# Patient Record
Sex: FEMALE | Race: WHITE | NOT HISPANIC OR LATINO | Employment: UNEMPLOYED | URBAN - METROPOLITAN AREA
[De-identification: names, ages, dates, MRNs, and addresses within clinical notes are randomized per-mention and may not be internally consistent; named-entity substitution may affect disease eponyms.]

---

## 2023-03-23 PROBLEM — M54.16 RIGHT LUMBAR RADICULOPATHY: Status: ACTIVE | Noted: 2023-03-23

## 2023-03-23 PROBLEM — R20.2 NUMBNESS AND TINGLING: Status: ACTIVE | Noted: 2023-03-23

## 2023-03-23 PROBLEM — K21.9 CHRONIC GERD: Status: ACTIVE | Noted: 2023-03-23

## 2023-03-23 PROBLEM — E04.1 THYROID NODULE: Status: ACTIVE | Noted: 2023-03-23

## 2023-03-23 PROBLEM — R29.898 TRANSIENT RIGHT LEG WEAKNESS: Status: ACTIVE | Noted: 2023-03-23

## 2023-03-23 PROBLEM — M70.61 TROCHANTERIC BURSITIS OF RIGHT HIP: Status: ACTIVE | Noted: 2023-03-23

## 2023-03-23 PROBLEM — E78.5 HYPERLIPIDEMIA: Status: ACTIVE | Noted: 2023-03-23

## 2023-03-23 PROBLEM — M53.87 SCIATICA OF RIGHT SIDE ASSOCIATED WITH DISORDER OF LUMBOSACRAL SPINE: Status: ACTIVE | Noted: 2023-03-23

## 2023-03-23 PROBLEM — G43.009 MIGRAINE WITHOUT AURA AND WITHOUT STATUS MIGRAINOSUS, NOT INTRACTABLE: Status: ACTIVE | Noted: 2023-03-23

## 2023-03-23 PROBLEM — E06.3 HASHIMOTO'S DISEASE: Status: ACTIVE | Noted: 2023-03-23

## 2023-03-23 PROBLEM — R20.0 NUMBNESS AND TINGLING: Status: ACTIVE | Noted: 2023-03-23

## 2023-03-23 PROBLEM — E06.3 HYPOTHYROIDISM DUE TO HASHIMOTO'S THYROIDITIS: Status: ACTIVE | Noted: 2023-03-23

## 2023-03-23 PROBLEM — E55.9 VITAMIN D DEFICIENCY DISEASE: Status: ACTIVE | Noted: 2023-03-23

## 2023-03-23 PROBLEM — E03.8 HYPOTHYROIDISM DUE TO HASHIMOTO'S THYROIDITIS: Status: ACTIVE | Noted: 2023-03-23

## 2023-03-23 RX ORDER — ZINC GLUCONATE 100 MG
TABLET ORAL
COMMUNITY
Start: 2022-05-24

## 2023-03-23 RX ORDER — CHOLECALCIFEROL (VITAMIN D3) 125 MCG
CAPSULE ORAL
COMMUNITY
Start: 2022-05-24

## 2023-03-23 RX ORDER — LEVOTHYROXINE SODIUM 125 UG/1
1 TABLET ORAL DAILY
COMMUNITY
Start: 2022-05-24 | End: 2023-04-12 | Stop reason: SDUPTHER

## 2023-03-23 RX ORDER — MINERAL OIL
1 ENEMA (ML) RECTAL DAILY PRN
COMMUNITY
Start: 2022-05-24

## 2023-03-23 RX ORDER — BACLOFEN 10 MG/1
1 TABLET ORAL 3 TIMES DAILY PRN
COMMUNITY
Start: 2022-05-24 | End: 2023-03-28 | Stop reason: ALTCHOICE

## 2023-03-23 RX ORDER — IBUPROFEN 100 MG/5ML
SUSPENSION, ORAL (FINAL DOSE FORM) ORAL
COMMUNITY
Start: 2022-05-24

## 2023-03-23 RX ORDER — SUMATRIPTAN SUCCINATE 100 MG/1
TABLET ORAL
COMMUNITY
Start: 2022-05-24 | End: 2023-09-21 | Stop reason: SDUPTHER

## 2023-03-23 RX ORDER — OMEPRAZOLE 40 MG/1
1 CAPSULE, DELAYED RELEASE ORAL DAILY
COMMUNITY
Start: 2022-05-24

## 2023-03-23 RX ORDER — SIMVASTATIN 20 MG/1
1 TABLET, FILM COATED ORAL NIGHTLY
COMMUNITY
Start: 2022-05-24 | End: 2023-04-12 | Stop reason: SDUPTHER

## 2023-03-28 ENCOUNTER — LAB (OUTPATIENT)
Dept: LAB | Facility: LAB | Age: 63
End: 2023-03-28
Payer: COMMERCIAL

## 2023-03-28 ENCOUNTER — OFFICE VISIT (OUTPATIENT)
Dept: PRIMARY CARE | Facility: CLINIC | Age: 63
End: 2023-03-28
Payer: COMMERCIAL

## 2023-03-28 VITALS
WEIGHT: 162.2 LBS | HEART RATE: 80 BPM | DIASTOLIC BLOOD PRESSURE: 80 MMHG | OXYGEN SATURATION: 97 % | HEIGHT: 65 IN | BODY MASS INDEX: 27.02 KG/M2 | SYSTOLIC BLOOD PRESSURE: 120 MMHG

## 2023-03-28 DIAGNOSIS — E03.8 HYPOTHYROIDISM DUE TO HASHIMOTO'S THYROIDITIS: ICD-10-CM

## 2023-03-28 DIAGNOSIS — G43.009 MIGRAINE WITHOUT AURA AND WITHOUT STATUS MIGRAINOSUS, NOT INTRACTABLE: ICD-10-CM

## 2023-03-28 DIAGNOSIS — E06.3 HYPOTHYROIDISM DUE TO HASHIMOTO'S THYROIDITIS: ICD-10-CM

## 2023-03-28 DIAGNOSIS — K21.9 CHRONIC GERD: ICD-10-CM

## 2023-03-28 DIAGNOSIS — R06.02 SHORTNESS OF BREATH: ICD-10-CM

## 2023-03-28 DIAGNOSIS — R07.89 ATYPICAL CHEST PAIN: Primary | ICD-10-CM

## 2023-03-28 PROBLEM — R20.2 NUMBNESS AND TINGLING: Status: RESOLVED | Noted: 2023-03-23 | Resolved: 2023-03-28

## 2023-03-28 PROBLEM — G47.09 OTHER INSOMNIA: Status: ACTIVE | Noted: 2023-03-28

## 2023-03-28 PROBLEM — M70.61 TROCHANTERIC BURSITIS OF RIGHT HIP: Status: RESOLVED | Noted: 2023-03-23 | Resolved: 2023-03-28

## 2023-03-28 PROBLEM — R29.898 TRANSIENT RIGHT LEG WEAKNESS: Status: RESOLVED | Noted: 2023-03-23 | Resolved: 2023-03-28

## 2023-03-28 PROBLEM — M53.87 SCIATICA OF RIGHT SIDE ASSOCIATED WITH DISORDER OF LUMBOSACRAL SPINE: Status: RESOLVED | Noted: 2023-03-23 | Resolved: 2023-03-28

## 2023-03-28 PROBLEM — R53.83 OTHER FATIGUE: Status: ACTIVE | Noted: 2023-03-28

## 2023-03-28 PROBLEM — R20.0 NUMBNESS AND TINGLING: Status: RESOLVED | Noted: 2023-03-23 | Resolved: 2023-03-28

## 2023-03-28 LAB
ALANINE AMINOTRANSFERASE (SGPT) (U/L) IN SER/PLAS: 20 U/L (ref 7–45)
ALBUMIN (G/DL) IN SER/PLAS: 4.6 G/DL (ref 3.4–5)
ALKALINE PHOSPHATASE (U/L) IN SER/PLAS: 69 U/L (ref 33–136)
ANION GAP IN SER/PLAS: 13 MMOL/L (ref 10–20)
ASPARTATE AMINOTRANSFERASE (SGOT) (U/L) IN SER/PLAS: 22 U/L (ref 9–39)
BASOPHILS (10*3/UL) IN BLOOD BY AUTOMATED COUNT: 0.04 X10E9/L (ref 0–0.1)
BASOPHILS/100 LEUKOCYTES IN BLOOD BY AUTOMATED COUNT: 0.7 % (ref 0–2)
BILIRUBIN TOTAL (MG/DL) IN SER/PLAS: 0.4 MG/DL (ref 0–1.2)
CALCIUM (MG/DL) IN SER/PLAS: 10.2 MG/DL (ref 8.6–10.3)
CARBON DIOXIDE, TOTAL (MMOL/L) IN SER/PLAS: 28 MMOL/L (ref 21–32)
CHLORIDE (MMOL/L) IN SER/PLAS: 103 MMOL/L (ref 98–107)
CREATININE (MG/DL) IN SER/PLAS: 0.7 MG/DL (ref 0.5–1.05)
EOSINOPHILS (10*3/UL) IN BLOOD BY AUTOMATED COUNT: 0.13 X10E9/L (ref 0–0.7)
EOSINOPHILS/100 LEUKOCYTES IN BLOOD BY AUTOMATED COUNT: 2.1 % (ref 0–6)
ERYTHROCYTE DISTRIBUTION WIDTH (RATIO) BY AUTOMATED COUNT: 13.8 % (ref 11.5–14.5)
ERYTHROCYTE MEAN CORPUSCULAR HEMOGLOBIN CONCENTRATION (G/DL) BY AUTOMATED: 32.6 G/DL (ref 32–36)
ERYTHROCYTE MEAN CORPUSCULAR VOLUME (FL) BY AUTOMATED COUNT: 94 FL (ref 80–100)
ERYTHROCYTES (10*6/UL) IN BLOOD BY AUTOMATED COUNT: 4.28 X10E12/L (ref 4–5.2)
GFR FEMALE: >90 ML/MIN/1.73M2
GLUCOSE (MG/DL) IN SER/PLAS: 96 MG/DL (ref 74–99)
HEMATOCRIT (%) IN BLOOD BY AUTOMATED COUNT: 40.2 % (ref 36–46)
HEMOGLOBIN (G/DL) IN BLOOD: 13.1 G/DL (ref 12–16)
IMMATURE GRANULOCYTES/100 LEUKOCYTES IN BLOOD BY AUTOMATED COUNT: 0.2 % (ref 0–0.9)
LEUKOCYTES (10*3/UL) IN BLOOD BY AUTOMATED COUNT: 6.1 X10E9/L (ref 4.4–11.3)
LYMPHOCYTES (10*3/UL) IN BLOOD BY AUTOMATED COUNT: 2.13 X10E9/L (ref 1.2–4.8)
LYMPHOCYTES/100 LEUKOCYTES IN BLOOD BY AUTOMATED COUNT: 35 % (ref 13–44)
MONOCYTES (10*3/UL) IN BLOOD BY AUTOMATED COUNT: 0.43 X10E9/L (ref 0.1–1)
MONOCYTES/100 LEUKOCYTES IN BLOOD BY AUTOMATED COUNT: 7.1 % (ref 2–10)
NEUTROPHILS (10*3/UL) IN BLOOD BY AUTOMATED COUNT: 3.35 X10E9/L (ref 1.2–7.7)
NEUTROPHILS/100 LEUKOCYTES IN BLOOD BY AUTOMATED COUNT: 54.9 % (ref 40–80)
PLATELETS (10*3/UL) IN BLOOD AUTOMATED COUNT: 241 X10E9/L (ref 150–450)
POTASSIUM (MMOL/L) IN SER/PLAS: 4.3 MMOL/L (ref 3.5–5.3)
PROTEIN TOTAL: 7.1 G/DL (ref 6.4–8.2)
SODIUM (MMOL/L) IN SER/PLAS: 140 MMOL/L (ref 136–145)
THYROTROPIN (MIU/L) IN SER/PLAS BY DETECTION LIMIT <= 0.05 MIU/L: 0.55 MIU/L (ref 0.44–3.98)
UREA NITROGEN (MG/DL) IN SER/PLAS: 13 MG/DL (ref 6–23)

## 2023-03-28 PROCEDURE — 99214 OFFICE O/P EST MOD 30 MIN: CPT | Performed by: NURSE PRACTITIONER

## 2023-03-28 PROCEDURE — 84443 ASSAY THYROID STIM HORMONE: CPT

## 2023-03-28 PROCEDURE — 80053 COMPREHEN METABOLIC PANEL: CPT

## 2023-03-28 PROCEDURE — 1036F TOBACCO NON-USER: CPT | Performed by: NURSE PRACTITIONER

## 2023-03-28 PROCEDURE — 36415 COLL VENOUS BLD VENIPUNCTURE: CPT

## 2023-03-28 PROCEDURE — 85025 COMPLETE CBC W/AUTO DIFF WBC: CPT

## 2023-03-28 RX ORDER — AZELASTINE 1 MG/ML
1 SPRAY, METERED NASAL 2 TIMES DAILY
COMMUNITY
End: 2023-09-21 | Stop reason: ALTCHOICE

## 2023-03-28 RX ORDER — MULTIVITAMIN
1 TABLET ORAL DAILY
COMMUNITY
Start: 2002-01-31

## 2023-03-28 RX ORDER — FLUTICASONE PROPIONATE 50 MCG
2 SPRAY, SUSPENSION (ML) NASAL DAILY
COMMUNITY
End: 2024-04-04 | Stop reason: ALTCHOICE

## 2023-03-28 ASSESSMENT — ENCOUNTER SYMPTOMS
SHORTNESS OF BREATH: 1
FATIGUE: 1
BACK PAIN: 1
HEADACHES: 1

## 2023-03-28 NOTE — ASSESSMENT & PLAN NOTE
Pt has reports she has not been taking her omeprazole. She is having stabbing pains in her abdomen

## 2023-03-28 NOTE — PATIENT INSTRUCTIONS
As we discussed I ordered routine blood work for you please complete this today we will notify you of the results.  I am checking thyroid levels this could contribute to your tiredness fatigue as well as a CBC and a CMP.    Continue make sure you are remaining hydrated    Due to intermittent chest discomfort I have ordered a CT calcium scoring as well as a referral to cardiology    Please continue to increase your physical activity as tolerated.    Follow-up in 4 to 6 weeks

## 2023-03-28 NOTE — PROGRESS NOTES
Subjective   Patient ID: Eduarda Foss is a 63 y.o. female who presents for Fatigue (Pt also is having weakness in her legs), Shortness of Breath, Back Pain, Headache, and Memory Loss.    Fatigue  This is a chronic problem. The current episode started more than 1 year ago. The problem occurs constantly. The problem has been unchanged. Associated symptoms include chest pain, fatigue and headaches. The symptoms are aggravated by standing and walking. She has tried nothing for the symptoms. The treatment provided no relief.   Shortness of Breath  The current episode started more than 1 month ago. The problem occurs intermittently. The problem has been waxing and waning. Associated symptoms include chest pain and headaches. The treatment provided mild relief.   Back Pain  This is a chronic problem. The current episode started more than 1 month ago. The problem occurs intermittently. The problem is unchanged. The pain is present in the lumbar spine. Associated symptoms include chest pain and headaches.   Headache   This is a chronic problem. The current episode started more than 1 year ago. The problem has been gradually improving. Associated symptoms include back pain.   Memory Loss    Patient reports onset of memory loss was 1 to 6 months ago. Onset quality is gradual.     Symptoms associated with memory loss include changes in short-term memory, changes in long-term memory and difficulty recalling words. Symptoms do not include disorientation outside familiar environments.    Patient does not have the following behavorial problems associated with memory loss: paranoia, hallucinations, delusions or agitation.    The family and/or patient does not have the following concerns associated with memory loss: medication errors, wandering, driving, cooking or preparing meals.       Review of Systems   Constitutional:  Positive for fatigue.   Respiratory:  Positive for shortness of breath.    Cardiovascular:  Positive for chest  "pain.        Chest pain has been intermittent but only last for a few seconds.    Musculoskeletal:  Positive for back pain.   Neurological:  Positive for headaches.   All other systems reviewed and are negative.      Objective   /80   Pulse 80   Ht 1.651 m (5' 5\")   Wt 73.6 kg (162 lb 3.2 oz)   SpO2 97%   BMI 26.99 kg/m²     Physical Exam  Constitutional:       Appearance: Normal appearance.   Cardiovascular:      Rate and Rhythm: Normal rate.      Heart sounds: Normal heart sounds.   Pulmonary:      Effort: Pulmonary effort is normal.      Breath sounds: Normal breath sounds.   Abdominal:      General: Abdomen is flat. Bowel sounds are normal. There is no distension.      Palpations: Abdomen is soft. There is no mass.      Tenderness: There is no abdominal tenderness. There is no right CVA tenderness, left CVA tenderness, guarding or rebound.      Hernia: No hernia is present.   Musculoskeletal:         General: Normal range of motion.   Skin:     General: Skin is warm and dry.   Neurological:      General: No focal deficit present.      Mental Status: She is alert and oriented to person, place, and time.   Psychiatric:         Mood and Affect: Mood normal.         Assessment/Plan   Problem List Items Addressed This Visit          Respiratory    Shortness of breath    Relevant Orders    Referral to Cardiology    CT cardiac scoring wo IV contrast       Digestive    Chronic GERD     Pt has reports she has not been taking her omeprazole. She is having stabbing pains in her abdomen         Relevant Orders    Tsh With Reflex To Free T4 If Abnormal    CBC and Auto Differential    Comprehensive Metabolic Panel       Endocrine/Metabolic    Hypothyroidism due to Hashimoto's thyroiditis     She feels more tired then usual.  She feels it could be related to not sleeping well.          Relevant Orders    Tsh With Reflex To Free T4 If Abnormal    CBC and Auto Differential    Comprehensive Metabolic Panel       Other "    Migraine without aura and without status migrainosus, not intractable     Pt reports well controlled and states it has been maybe one in last 3 months           Relevant Orders    Tsh With Reflex To Free T4 If Abnormal    CBC and Auto Differential    Comprehensive Metabolic Panel    Atypical chest pain - Primary    Relevant Orders    Referral to Cardiology    CT cardiac scoring wo IV contrast

## 2023-03-28 NOTE — ASSESSMENT & PLAN NOTE
Pt reports back pain has improved. She continues to stretch. She states for the most part as long as she is moving she is better

## 2023-03-30 ENCOUNTER — TELEPHONE (OUTPATIENT)
Dept: PRIMARY CARE | Facility: CLINIC | Age: 63
End: 2023-03-30
Payer: COMMERCIAL

## 2023-04-03 ENCOUNTER — TELEPHONE (OUTPATIENT)
Dept: PRIMARY CARE | Facility: CLINIC | Age: 63
End: 2023-04-03
Payer: COMMERCIAL

## 2023-04-03 NOTE — TELEPHONE ENCOUNTER
Requesting a referral for a neurologist. Pt states she is still having migraines that effect her eyes, along with numbness in her fingers.

## 2023-04-04 DIAGNOSIS — G43.009 MIGRAINE WITHOUT AURA AND WITHOUT STATUS MIGRAINOSUS, NOT INTRACTABLE: ICD-10-CM

## 2023-04-12 DIAGNOSIS — E03.8 HYPOTHYROIDISM DUE TO HASHIMOTO'S THYROIDITIS: ICD-10-CM

## 2023-04-12 DIAGNOSIS — E06.3 HYPOTHYROIDISM DUE TO HASHIMOTO'S THYROIDITIS: ICD-10-CM

## 2023-04-12 DIAGNOSIS — E78.5 HYPERLIPIDEMIA, UNSPECIFIED HYPERLIPIDEMIA TYPE: ICD-10-CM

## 2023-04-12 RX ORDER — SIMVASTATIN 20 MG/1
20 TABLET, FILM COATED ORAL NIGHTLY
Qty: 90 TABLET | Refills: 1 | Status: SHIPPED | OUTPATIENT
Start: 2023-04-12 | End: 2023-10-26

## 2023-04-12 RX ORDER — LEVOTHYROXINE SODIUM 125 UG/1
125 TABLET ORAL DAILY
Qty: 90 TABLET | Refills: 1 | Status: SHIPPED | OUTPATIENT
Start: 2023-04-12 | End: 2023-10-26

## 2023-06-02 ENCOUNTER — TELEPHONE (OUTPATIENT)
Dept: PRIMARY CARE | Facility: CLINIC | Age: 63
End: 2023-06-02
Payer: COMMERCIAL

## 2023-06-02 NOTE — TELEPHONE ENCOUNTER
----- Message from TANMAY Winn sent at 6/2/2023  2:02 PM EDT -----  Lab results Mild risk for coronary artery disease.  Recommend we repeat CT calcium scoring in 5 years

## 2023-06-02 NOTE — TELEPHONE ENCOUNTER
Result Communication    Resulted Orders   CT cardiac scoring wo IV contrast    Narrative    Interpreted By:  MARTIN CLARKE MD  MRN: [id number]  Patient Name: KIARA BENEDICT     STUDY:  CT CARDIAC SCORING;  6/2/2023 7:22 am     INDICATION:  family history of cardiovascular disease, mild dialation of thoraic  aorta on previous CT cardiac scoring.     COMPARISON:  None.     ACCESSION NUMBER(S):  [id number]     ORDERING CLINICIAN:  HAMZAH KOCH     TECHNIQUE:  Using prospective ECG gating, CT scan of the coronary arteries was  performed without intravenous contrast. Coronary calcium scoring  was  performed according to the method of Agatston.     FINDINGS:  The score and distribution of calcium in the coronary arteries is as  follows:     LM 24.29,  LAD 53.29,  LCx 18.29,  RCA 0,     Total 95.87     The visualized ascending thoracic aorta measures 3.9 cm in diameter.  The heart is normal in size. No pericardial effusion is present.     No gross evidence of mediastinal or hilar lymphadenopathy or masses  is identified. The visualized segments of the lungs are normally  expanded.     The main pulmonary artery, right and left pulmonary artery are normal  in size.     The visualized subdiaphragmatic structures appear intact.       Impression    1. Coronary artery calcium score of 95.87.*.  2. Mild interval increase in atherosclerotic calcification of the  coronary arteries with slight interval increase in ascending aortic  caliber when compared to a study of 02/18. Correlate with a component  of systemic hypertension. Recommend a continued surveillance CT scan  in 5 years to monitor calcium score and aortic size.     *Coronary Artery  Agatston score     Score  risk  Very low 1-99  Mildly increased 100-299  Moderately increased >300  Moderate to severely increased >800     Gulshan et al. JCCT 2016 (http://dx.doi.org/10.1016/j.jcct.[email or web address])     RANDOLPH 10-Year CHD Risk with Coronary Artery Calcification can be  calcuate using  link below  [email or web address]  Dominik banks al. JACC 2015 (http://dx.doi.org/10.1016/j.j  acc.[email or web address])       4:38 PM      Results were successfully communicated with the patient and they acknowledged their understanding.

## 2023-09-20 PROBLEM — K21.9 GASTROESOPHAGEAL REFLUX DISEASE WITHOUT ESOPHAGITIS: Status: ACTIVE | Noted: 2021-05-04

## 2023-09-20 PROBLEM — T78.40XA ALLERGIC: Status: ACTIVE | Noted: 2020-06-20

## 2023-09-20 PROBLEM — R09.81 NASAL CONGESTION: Status: RESOLVED | Noted: 2021-04-06 | Resolved: 2023-09-20

## 2023-09-20 PROBLEM — J37.0 CHRONIC LARYNGITIS: Status: ACTIVE | Noted: 2021-05-04

## 2023-09-20 PROBLEM — J30.2 SEASONAL ALLERGIC RHINITIS: Status: ACTIVE | Noted: 2021-04-06

## 2023-09-20 RX ORDER — LEVOTHYROXINE SODIUM 100 UG/1
TABLET ORAL
COMMUNITY
End: 2024-04-04 | Stop reason: ALTCHOICE

## 2023-09-20 RX ORDER — MONTELUKAST SODIUM 10 MG/1
1 TABLET ORAL DAILY
COMMUNITY
End: 2024-04-04 | Stop reason: ALTCHOICE

## 2023-09-20 RX ORDER — PHENOL 1.4 %
AEROSOL, SPRAY (ML) MUCOUS MEMBRANE
COMMUNITY
Start: 2022-05-24 | End: 2024-04-04 | Stop reason: ALTCHOICE

## 2023-09-20 RX ORDER — LEVOCETIRIZINE DIHYDROCHLORIDE 5 MG/1
1 TABLET, FILM COATED ORAL DAILY
COMMUNITY
End: 2024-04-04 | Stop reason: ALTCHOICE

## 2023-09-20 ASSESSMENT — ENCOUNTER SYMPTOMS
HYPERTENSION: 1
BLURRED VISION: 0
SHORTNESS OF BREATH: 0
HEADACHES: 1
SWEATS: 0
PND: 0
ORTHOPNEA: 0
PALPITATIONS: 0
NECK PAIN: 1

## 2023-09-21 ENCOUNTER — OFFICE VISIT (OUTPATIENT)
Dept: PRIMARY CARE | Facility: CLINIC | Age: 63
End: 2023-09-21
Payer: COMMERCIAL

## 2023-09-21 ENCOUNTER — LAB (OUTPATIENT)
Dept: LAB | Facility: LAB | Age: 63
End: 2023-09-21
Payer: COMMERCIAL

## 2023-09-21 VITALS
BODY MASS INDEX: 26.16 KG/M2 | DIASTOLIC BLOOD PRESSURE: 98 MMHG | WEIGHT: 157 LBS | HEART RATE: 81 BPM | SYSTOLIC BLOOD PRESSURE: 134 MMHG | HEIGHT: 65 IN | OXYGEN SATURATION: 98 %

## 2023-09-21 DIAGNOSIS — G43.009 MIGRAINE WITHOUT AURA AND WITHOUT STATUS MIGRAINOSUS, NOT INTRACTABLE: ICD-10-CM

## 2023-09-21 DIAGNOSIS — I10 HTN (HYPERTENSION), BENIGN: ICD-10-CM

## 2023-09-21 DIAGNOSIS — E78.2 MIXED HYPERLIPIDEMIA: ICD-10-CM

## 2023-09-21 DIAGNOSIS — I10 HTN (HYPERTENSION), BENIGN: Primary | ICD-10-CM

## 2023-09-21 LAB
ALANINE AMINOTRANSFERASE (SGPT) (U/L) IN SER/PLAS: 18 U/L (ref 7–45)
ALBUMIN (G/DL) IN SER/PLAS: 4.7 G/DL (ref 3.4–5)
ALKALINE PHOSPHATASE (U/L) IN SER/PLAS: 61 U/L (ref 33–136)
ANION GAP IN SER/PLAS: 14 MMOL/L (ref 10–20)
ASPARTATE AMINOTRANSFERASE (SGOT) (U/L) IN SER/PLAS: 18 U/L (ref 9–39)
BILIRUBIN TOTAL (MG/DL) IN SER/PLAS: 0.4 MG/DL (ref 0–1.2)
CALCIUM (MG/DL) IN SER/PLAS: 9.8 MG/DL (ref 8.6–10.3)
CARBON DIOXIDE, TOTAL (MMOL/L) IN SER/PLAS: 28 MMOL/L (ref 21–32)
CHLORIDE (MMOL/L) IN SER/PLAS: 102 MMOL/L (ref 98–107)
CHOLESTEROL (MG/DL) IN SER/PLAS: 182 MG/DL (ref 0–199)
CHOLESTEROL IN HDL (MG/DL) IN SER/PLAS: 71.1 MG/DL
CHOLESTEROL/HDL RATIO: 2.6
CREATININE (MG/DL) IN SER/PLAS: 0.72 MG/DL (ref 0.5–1.05)
ERYTHROCYTE DISTRIBUTION WIDTH (RATIO) BY AUTOMATED COUNT: 13.3 % (ref 11.5–14.5)
ERYTHROCYTE MEAN CORPUSCULAR HEMOGLOBIN CONCENTRATION (G/DL) BY AUTOMATED: 32.5 G/DL (ref 32–36)
ERYTHROCYTE MEAN CORPUSCULAR VOLUME (FL) BY AUTOMATED COUNT: 95 FL (ref 80–100)
ERYTHROCYTES (10*6/UL) IN BLOOD BY AUTOMATED COUNT: 4.22 X10E12/L (ref 4–5.2)
GFR FEMALE: >90 ML/MIN/1.73M2
GLUCOSE (MG/DL) IN SER/PLAS: 83 MG/DL (ref 74–99)
HEMATOCRIT (%) IN BLOOD BY AUTOMATED COUNT: 40 % (ref 36–46)
HEMOGLOBIN (G/DL) IN BLOOD: 13 G/DL (ref 12–16)
LEUKOCYTES (10*3/UL) IN BLOOD BY AUTOMATED COUNT: 6.3 X10E9/L (ref 4.4–11.3)
NON-HDL CHOLESTEROL: 111 MG/DL
PLATELETS (10*3/UL) IN BLOOD AUTOMATED COUNT: 268 X10E9/L (ref 150–450)
POTASSIUM (MMOL/L) IN SER/PLAS: 4.8 MMOL/L (ref 3.5–5.3)
PROTEIN TOTAL: 7.5 G/DL (ref 6.4–8.2)
SODIUM (MMOL/L) IN SER/PLAS: 139 MMOL/L (ref 136–145)
UREA NITROGEN (MG/DL) IN SER/PLAS: 18 MG/DL (ref 6–23)

## 2023-09-21 PROCEDURE — 36415 COLL VENOUS BLD VENIPUNCTURE: CPT

## 2023-09-21 PROCEDURE — 85027 COMPLETE CBC AUTOMATED: CPT

## 2023-09-21 PROCEDURE — 3075F SYST BP GE 130 - 139MM HG: CPT | Performed by: NURSE PRACTITIONER

## 2023-09-21 PROCEDURE — 83718 ASSAY OF LIPOPROTEIN: CPT

## 2023-09-21 PROCEDURE — 99213 OFFICE O/P EST LOW 20 MIN: CPT | Performed by: NURSE PRACTITIONER

## 2023-09-21 PROCEDURE — 82465 ASSAY BLD/SERUM CHOLESTEROL: CPT

## 2023-09-21 PROCEDURE — 3080F DIAST BP >= 90 MM HG: CPT | Performed by: NURSE PRACTITIONER

## 2023-09-21 PROCEDURE — 80053 COMPREHEN METABOLIC PANEL: CPT

## 2023-09-21 PROCEDURE — 1036F TOBACCO NON-USER: CPT | Performed by: NURSE PRACTITIONER

## 2023-09-21 RX ORDER — SUMATRIPTAN SUCCINATE 100 MG/1
100 TABLET ORAL ONCE AS NEEDED
Qty: 9 TABLET | Refills: 2 | Status: SHIPPED | OUTPATIENT
Start: 2023-09-21

## 2023-09-21 RX ORDER — HYDROCHLOROTHIAZIDE 12.5 MG/1
12.5 TABLET ORAL DAILY
Qty: 30 TABLET | Refills: 1 | Status: SHIPPED | OUTPATIENT
Start: 2023-09-21 | End: 2023-11-20

## 2023-09-21 ASSESSMENT — ENCOUNTER SYMPTOMS
HYPERTENSION: 1
NECK PAIN: 1
PND: 0
ORTHOPNEA: 0
HEADACHES: 1
SHORTNESS OF BREATH: 0
PALPITATIONS: 0
BLURRED VISION: 0
SWEATS: 0

## 2023-09-21 ASSESSMENT — PATIENT HEALTH QUESTIONNAIRE - PHQ9
2. FEELING DOWN, DEPRESSED OR HOPELESS: NOT AT ALL
SUM OF ALL RESPONSES TO PHQ9 QUESTIONS 1 AND 2: 0
1. LITTLE INTEREST OR PLEASURE IN DOING THINGS: NOT AT ALL

## 2023-09-21 ASSESSMENT — COLUMBIA-SUICIDE SEVERITY RATING SCALE - C-SSRS
6. HAVE YOU EVER DONE ANYTHING, STARTED TO DO ANYTHING, OR PREPARED TO DO ANYTHING TO END YOUR LIFE?: NO
2. HAVE YOU ACTUALLY HAD ANY THOUGHTS OF KILLING YOURSELF?: NO
1. IN THE PAST MONTH, HAVE YOU WISHED YOU WERE DEAD OR WISHED YOU COULD GO TO SLEEP AND NOT WAKE UP?: NO

## 2023-09-21 NOTE — PATIENT INSTRUCTIONS
6 week blood pressure check - nurse visit    Starting on hydrochlorothiazide low dosage once daily    Here are some general tips to help manage and possibly lower high blood pressure:    Dietary Changes:    Reduce Sodium: Lower your sodium intake, which often means cutting back on processed foods and not adding extra salt to meals.  DASH Diet: Consider the Dietary Approaches to Stop Hypertension (DASH) diet which emphasizes fruits, vegetables, whole grains, and low-fat dairy products.  Limit Alcohol and Caffeine: Both can raise blood pressure, so consume them in moderation.  Maintain a Healthy Weight:    Even losing a small amount of weight can make a significant difference in lowering blood pressure.  Exercise Regularly:    Aim for at least 30 minutes of moderate aerobic activity most days of the week. Activities like brisk walking, cycling, or swimming can help.  Manage Stress:    Techniques such as deep breathing exercises, meditation, and yoga can be beneficial.  Limit Tobacco Use:    Avoid smoking and limit exposure to secondhand smoke. Nicotine can raise blood pressure and heart rate.  Limit Added Sugars and Saturated Fats:    These can contribute to weight gain and increased blood pressure.  Monitor Your Blood Pressure:    Regularly check your blood pressure at home using a home blood pressure monitor. This will help you keep track of any fluctuations and see the effects of lifestyle changes.  Medication:    If your doctor has prescribed medication for hypertension, be sure to take it as directed. Do not stop taking the medication without consulting your healthcare provider.  Limit Over-the-counter Medications:    Some over-the-counter medications, such as certain pain relievers, can raise blood pressure. Consult your doctor before taking any new medications.  Reduce Caffeine:    While the role of caffeine in blood pressure is still debated, it's a good idea for those with hypertension to consume it in  moderation until more is known.  Stay Hydrated:    Drink plenty of water throughout the day.  Limit Dietary Cholesterol:    Avoid foods high in cholesterol, which can contribute to plaque buildup in the arteries.

## 2023-09-21 NOTE — PROGRESS NOTES
"Subjective   Patient ID: Eduarda oFss is a 63 y.o. female who presents for Hypertension (Pt states her B/P has been running high the last week).    Has had elevated blood pressure recently . She has not felt well. She has had neck pain and headache not consistent intermittent . No vision changes     Hypertension  This is a new problem. The problem has been gradually worsening since onset. Associated symptoms include headaches and neck pain. Pertinent negatives include no anxiety, blurred vision, chest pain, orthopnea, palpitations, peripheral edema, PND, shortness of breath or sweats. Agents associated with hypertension include NSAIDs and thyroid hormones. Risk factors for coronary artery disease include dyslipidemia and family history. There are no compliance problems.         Review of Systems   Eyes:  Negative for blurred vision.   Respiratory:  Negative for shortness of breath.    Cardiovascular:  Negative for chest pain, palpitations, orthopnea and PND.   Musculoskeletal:  Positive for neck pain.   Neurological:  Positive for headaches.       Objective   BP (!) 134/98   Pulse 81   Ht 1.651 m (5' 5\")   Wt 71.2 kg (157 lb)   SpO2 98%   BMI 26.13 kg/m²     Physical Exam  Cardiovascular:      Rate and Rhythm: Normal rate.      Heart sounds: Normal heart sounds.   Pulmonary:      Effort: Pulmonary effort is normal.      Breath sounds: Normal breath sounds.   Skin:     General: Skin is warm.      Capillary Refill: Capillary refill takes less than 2 seconds.   Neurological:      General: No focal deficit present.      Mental Status: She is alert and oriented to person, place, and time.   Psychiatric:         Mood and Affect: Mood normal.         Behavior: Behavior normal.         Thought Content: Thought content normal.         Judgment: Judgment normal.         Assessment/Plan   Problem List Items Addressed This Visit       Hyperlipidemia    Relevant Orders    Comprehensive Metabolic Panel    Lipid Panel " Non-Fasting    Migraine without aura and without status migrainosus, not intractable    Relevant Medications    SUMAtriptan (Imitrex) 100 mg tablet    Other Relevant Orders    CBC     Other Visit Diagnoses       HTN (hypertension), benign    -  Primary    Relevant Medications    hydroCHLOROthiazide (HYDRODiuril) 12.5 mg tablet    Other Relevant Orders    Comprehensive Metabolic Panel    Lipid Panel Non-Fasting

## 2023-09-22 ENCOUNTER — TELEPHONE (OUTPATIENT)
Dept: PRIMARY CARE | Facility: CLINIC | Age: 63
End: 2023-09-22
Payer: COMMERCIAL

## 2023-10-26 DIAGNOSIS — E03.8 HYPOTHYROIDISM DUE TO HASHIMOTO'S THYROIDITIS: ICD-10-CM

## 2023-10-26 DIAGNOSIS — E06.3 HYPOTHYROIDISM DUE TO HASHIMOTO'S THYROIDITIS: ICD-10-CM

## 2023-10-26 DIAGNOSIS — E78.5 HYPERLIPIDEMIA, UNSPECIFIED HYPERLIPIDEMIA TYPE: ICD-10-CM

## 2023-10-26 RX ORDER — SIMVASTATIN 20 MG/1
20 TABLET, FILM COATED ORAL NIGHTLY
Qty: 90 TABLET | Refills: 1 | Status: SHIPPED | OUTPATIENT
Start: 2023-10-26

## 2023-10-26 RX ORDER — LEVOTHYROXINE SODIUM 125 UG/1
125 TABLET ORAL DAILY
Qty: 90 TABLET | Refills: 1 | Status: SHIPPED | OUTPATIENT
Start: 2023-10-26

## 2023-11-02 ENCOUNTER — CLINICAL SUPPORT (OUTPATIENT)
Dept: PRIMARY CARE | Facility: CLINIC | Age: 63
End: 2023-11-02
Payer: COMMERCIAL

## 2023-11-02 VITALS — SYSTOLIC BLOOD PRESSURE: 128 MMHG | DIASTOLIC BLOOD PRESSURE: 88 MMHG

## 2023-11-02 DIAGNOSIS — I10 HTN (HYPERTENSION), BENIGN: ICD-10-CM

## 2024-02-15 ENCOUNTER — OFFICE VISIT (OUTPATIENT)
Dept: OTOLARYNGOLOGY | Facility: CLINIC | Age: 64
End: 2024-02-15
Payer: COMMERCIAL

## 2024-02-15 VITALS — BODY MASS INDEX: 24.49 KG/M2 | WEIGHT: 147 LBS | HEIGHT: 65 IN

## 2024-02-15 DIAGNOSIS — J32.0 CHRONIC MAXILLARY SINUSITIS: ICD-10-CM

## 2024-02-15 DIAGNOSIS — J34.89 NASAL OBSTRUCTION: ICD-10-CM

## 2024-02-15 DIAGNOSIS — J30.89 NON-SEASONAL ALLERGIC RHINITIS DUE TO OTHER ALLERGIC TRIGGER: Primary | ICD-10-CM

## 2024-02-15 DIAGNOSIS — J32.2 CHRONIC ETHMOIDAL SINUSITIS: ICD-10-CM

## 2024-02-15 DIAGNOSIS — J32.1 CHRONIC FRONTAL SINUSITIS: ICD-10-CM

## 2024-02-15 PROCEDURE — 99214 OFFICE O/P EST MOD 30 MIN: CPT | Performed by: OTOLARYNGOLOGY

## 2024-02-15 PROCEDURE — 1036F TOBACCO NON-USER: CPT | Performed by: OTOLARYNGOLOGY

## 2024-02-15 RX ORDER — AZELASTINE 1 MG/ML
2 SPRAY, METERED NASAL 2 TIMES DAILY
Qty: 90 ML | Refills: 1 | Status: SHIPPED | OUTPATIENT
Start: 2024-02-15

## 2024-02-15 RX ORDER — CEFDINIR 300 MG/1
300 CAPSULE ORAL 2 TIMES DAILY
Qty: 28 CAPSULE | Refills: 0 | Status: SHIPPED | OUTPATIENT
Start: 2024-02-15 | End: 2024-02-29

## 2024-02-15 RX ORDER — FLUTICASONE PROPIONATE 50 MCG
2 SPRAY, SUSPENSION (ML) NASAL DAILY
Qty: 48 ML | Refills: 1 | Status: SHIPPED | OUTPATIENT
Start: 2024-02-15

## 2024-02-15 RX ORDER — METHYLPREDNISOLONE 4 MG/1
TABLET ORAL
Qty: 21 TABLET | Refills: 0 | Status: SHIPPED | OUTPATIENT
Start: 2024-02-15 | End: 2024-04-04 | Stop reason: ALTCHOICE

## 2024-02-15 ASSESSMENT — ENCOUNTER SYMPTOMS
HEADACHES: 1
EYE ITCHING: 1

## 2024-02-15 NOTE — PROGRESS NOTES
Subjective   Patient ID: Eduarda Foss is a 64 y.o. female  HPI  Patient is complaining of a chronic history of pressure in the maxillary and ethmoid and frontal areas bilaterally with nasal congestion.  The pressure seems to directly correlate with the change of the weather.  She complains of chronic rhinorrhea and postnasal drainage.  She has no fevers or chills or nausea or vomiting but she does complain of headaches.  She was recently treated with a 10-day course of antibiotics.  Review of Systems   HENT:  Positive for postnasal drip and sneezing.         Nasal pain, itchy nose, scratchy throat   Eyes:  Positive for itching.        Watery eyes   Neurological:  Positive for headaches.       Objective   Physical Exam  The following elements of a brief ear nose and throat exam were performed: External ear canals and tympanic membranes, external nose and nasal passages, oral cavity, palpation of the neck, percussion of the face, palpation of the thyroid.    There is nasal edema and erythema.  There is thick mucoid postnasal drainage noted.  There is mild tenderness with percussion of the maxillary and ethmoid and frontal areas bilaterally.  The remainder of her exam was within normal limits.  Assessment/Plan   Diagnoses and all orders for this visit:  Non-seasonal allergic rhinitis due to other allergic trigger (Primary)  -     fluticasone (Flonase) 50 mcg/actuation nasal spray; Administer 2 sprays into each nostril once daily. Shake gently. Before first use, prime pump. After use, clean tip and replace cap.  -     azelastine (Astelin) 137 mcg (0.1 %) nasal spray; Administer 2 sprays into each nostril 2 times a day.  Nasal obstruction  Chronic frontal sinusitis  -     CT sinus wo IV contrast; Future  -     methylPREDNISolone (Medrol, Jesse,) 4 mg tablets; Follow schedule on package instructions  -     cefdinir (Omnicef) 300 mg capsule; Take 1 capsule (300 mg) by mouth 2 times a day for 14 days.  Chronic ethmoidal  sinusitis  -     CT sinus wo IV contrast; Future  -     methylPREDNISolone (Medrol, Jesse,) 4 mg tablets; Follow schedule on package instructions  -     cefdinir (Omnicef) 300 mg capsule; Take 1 capsule (300 mg) by mouth 2 times a day for 14 days.  Chronic maxillary sinusitis  -     CT sinus wo IV contrast; Future  -     methylPREDNISolone (Medrol, Jesse,) 4 mg tablets; Follow schedule on package instructions  -     cefdinir (Omnicef) 300 mg capsule; Take 1 capsule (300 mg) by mouth 2 times a day for 14 days.     1.  Chronic maxillary and ethmoid and frontal sinusitis with persistent symptoms despite taking a 10-day course of antibiotics and Flonase recently.  The patient was started on a 14-day course of Omnicef and a Medrol pack for a total of 24 days of antibiotic treatments.  She was advised to use saline irrigation and lavage.  A high-resolution CT scan of the sinuses will be obtained at the end of the treatment course and she will follow-up with the result.  2.  Chronic allergic rhinitis with persistent symptoms despite using Flonase.  The patient was started on Astelin in combination with the Flonase.   History/Exam/Medical Decision Making

## 2024-03-06 ENCOUNTER — APPOINTMENT (OUTPATIENT)
Dept: RADIOLOGY | Facility: CLINIC | Age: 64
End: 2024-03-06
Payer: COMMERCIAL

## 2024-03-13 ENCOUNTER — HOSPITAL ENCOUNTER (OUTPATIENT)
Dept: RADIOLOGY | Facility: CLINIC | Age: 64
Discharge: HOME | End: 2024-03-13
Payer: COMMERCIAL

## 2024-03-13 DIAGNOSIS — J32.0 CHRONIC MAXILLARY SINUSITIS: ICD-10-CM

## 2024-03-13 DIAGNOSIS — J32.1 CHRONIC FRONTAL SINUSITIS: ICD-10-CM

## 2024-03-13 DIAGNOSIS — J32.2 CHRONIC ETHMOIDAL SINUSITIS: ICD-10-CM

## 2024-03-13 PROCEDURE — 70486 CT MAXILLOFACIAL W/O DYE: CPT

## 2024-03-15 ENCOUNTER — APPOINTMENT (OUTPATIENT)
Dept: PRIMARY CARE | Facility: CLINIC | Age: 64
End: 2024-03-15
Payer: COMMERCIAL

## 2024-04-04 ENCOUNTER — OFFICE VISIT (OUTPATIENT)
Dept: PRIMARY CARE | Facility: CLINIC | Age: 64
End: 2024-04-04
Payer: COMMERCIAL

## 2024-04-04 VITALS
HEART RATE: 73 BPM | WEIGHT: 149 LBS | HEIGHT: 65 IN | BODY MASS INDEX: 24.83 KG/M2 | DIASTOLIC BLOOD PRESSURE: 92 MMHG | SYSTOLIC BLOOD PRESSURE: 132 MMHG | OXYGEN SATURATION: 96 %

## 2024-04-04 DIAGNOSIS — E06.3 HASHIMOTO'S DISEASE: ICD-10-CM

## 2024-04-04 DIAGNOSIS — E55.9 VITAMIN D DEFICIENCY: ICD-10-CM

## 2024-04-04 DIAGNOSIS — E78.5 HYPERLIPIDEMIA, UNSPECIFIED HYPERLIPIDEMIA TYPE: ICD-10-CM

## 2024-04-04 DIAGNOSIS — R73.09 ELEVATED GLUCOSE: Primary | ICD-10-CM

## 2024-04-04 PROCEDURE — 99214 OFFICE O/P EST MOD 30 MIN: CPT | Performed by: NURSE PRACTITIONER

## 2024-04-04 PROCEDURE — 1036F TOBACCO NON-USER: CPT | Performed by: NURSE PRACTITIONER

## 2024-04-04 ASSESSMENT — ENCOUNTER SYMPTOMS
FATIGUE: 0
COUGH: 0
DIARRHEA: 0
PALPITATIONS: 0
HEADACHES: 0
DIZZINESS: 0
SORE THROAT: 0
FEVER: 0
NAUSEA: 0
WEAKNESS: 0
ABDOMINAL PAIN: 0
CHILLS: 0
NUMBNESS: 0
VOMITING: 0
CONSTIPATION: 0
MUSCULOSKELETAL NEGATIVE: 1
PSYCHIATRIC NEGATIVE: 1
SHORTNESS OF BREATH: 0

## 2024-04-04 ASSESSMENT — PATIENT HEALTH QUESTIONNAIRE - PHQ9
SUM OF ALL RESPONSES TO PHQ9 QUESTIONS 1 AND 2: 0
1. LITTLE INTEREST OR PLEASURE IN DOING THINGS: NOT AT ALL
2. FEELING DOWN, DEPRESSED OR HOPELESS: NOT AT ALL

## 2024-04-04 NOTE — PROGRESS NOTES
"Subjective   Patient ID: Eduarda Foss is a 64 y.o. female who presents to establish relationship with new primary care provider.      HPI   Here to establish care.   with 3 children.  Works part time at [employer].  Nonsmoker, alcohol 4 times weekly, no drug use.  You tube videos in her living rooms and stretching 6 days a week.  Mood is good.  Sleep is better with gummies and melatonin.  Denies chest pain, SOB, palpitations, dizziness,  or GI issues.  Has occasional headaches.  Zigzags in eyes equate to her migraines.  Has not seen ophthalmologist in many years.  Taking medications as prescribed       Review of Systems   Constitutional:  Negative for chills, fatigue and fever.   HENT:  Negative for congestion, ear pain and sore throat.    Eyes:  Positive for visual disturbance.        Zigzags in vision with migraines. Is not having the headache though like she used to   Respiratory:  Negative for cough and shortness of breath.    Cardiovascular:  Negative for chest pain, palpitations and leg swelling.   Gastrointestinal:  Negative for abdominal pain, constipation, diarrhea, nausea and vomiting.   Genitourinary: Negative.    Musculoskeletal: Negative.    Skin:  Negative for rash.   Neurological:  Negative for dizziness, weakness, numbness and headaches.   Psychiatric/Behavioral: Negative.         Objective   BP (!) 132/92   Pulse 73   Ht 1.651 m (5' 5\")   Wt 67.6 kg (149 lb)   SpO2 96%   BMI 24.79 kg/m²     Physical Exam  Constitutional:       General: She is not in acute distress.     Appearance: Normal appearance.   HENT:      Head: Normocephalic and atraumatic.      Right Ear: Tympanic membrane, ear canal and external ear normal.      Left Ear: Tympanic membrane, ear canal and external ear normal.      Nose: Nose normal.      Mouth/Throat:      Mouth: Mucous membranes are moist.      Pharynx: Oropharynx is clear.   Eyes:      Extraocular Movements: Extraocular movements intact.      " Conjunctiva/sclera: Conjunctivae normal.      Pupils: Pupils are equal, round, and reactive to light.   Cardiovascular:      Rate and Rhythm: Normal rate and regular rhythm.      Pulses: Normal pulses.      Heart sounds: Normal heart sounds. No murmur heard.  Pulmonary:      Effort: Pulmonary effort is normal.      Breath sounds: Normal breath sounds. No wheezing, rhonchi or rales.   Abdominal:      General: Bowel sounds are normal.      Palpations: Abdomen is soft.      Tenderness: There is no abdominal tenderness.   Musculoskeletal:         General: Normal range of motion.      Cervical back: Normal range of motion and neck supple.   Lymphadenopathy:      Comments: No lymphadenopathy noted   Skin:     General: Skin is warm and dry.      Findings: No rash.   Neurological:      General: No focal deficit present.      Mental Status: She is alert and oriented to person, place, and time.      Cranial Nerves: No cranial nerve deficit.      Coordination: Coordination normal.      Gait: Gait normal.   Psychiatric:         Mood and Affect: Mood normal.         Behavior: Behavior normal.         Assessment/Plan   Problem List Items Addressed This Visit             ICD-10-CM    Hashimoto's disease E06.3    Relevant Orders    Comprehensive Metabolic Panel    TSH with reflex to Free T4 if abnormal    Lipid Panel    CBC and Auto Differential    Hyperlipidemia E78.5    Relevant Orders    Comprehensive Metabolic Panel    TSH with reflex to Free T4 if abnormal    Lipid Panel    CBC and Auto Differential     Other Visit Diagnoses         Codes    Elevated glucose    -  Primary R73.09    Relevant Orders    Hemoglobin A1C    Vitamin D deficiency     E55.9    Relevant Orders    Vitamin D 25-Hydroxy,Total (for eval of Vitamin D levels)        Will call in prescriptions as needed.  Will drop off recent labs done by last provider.  Mammogram: done recently, waiting for report  Colonoscopy and EGD:  UTD done in 10/2021 repeat in; 10 years  per patient.  Follow up in 6 months or sooner if needed

## 2024-05-21 ENCOUNTER — LAB (OUTPATIENT)
Dept: LAB | Facility: LAB | Age: 64
End: 2024-05-21
Payer: COMMERCIAL

## 2024-05-21 DIAGNOSIS — E55.9 VITAMIN D DEFICIENCY: ICD-10-CM

## 2024-05-21 DIAGNOSIS — E06.3 HASHIMOTO'S DISEASE: ICD-10-CM

## 2024-05-21 DIAGNOSIS — E78.5 HYPERLIPIDEMIA, UNSPECIFIED HYPERLIPIDEMIA TYPE: ICD-10-CM

## 2024-05-21 DIAGNOSIS — R73.09 ELEVATED GLUCOSE: ICD-10-CM

## 2024-05-21 LAB
25(OH)D3 SERPL-MCNC: 48 NG/ML (ref 30–100)
ALBUMIN SERPL BCP-MCNC: 4.6 G/DL (ref 3.4–5)
ALP SERPL-CCNC: 60 U/L (ref 33–136)
ALT SERPL W P-5'-P-CCNC: 14 U/L (ref 7–45)
ANION GAP SERPL CALC-SCNC: 12 MMOL/L (ref 10–20)
AST SERPL W P-5'-P-CCNC: 17 U/L (ref 9–39)
BASOPHILS # BLD AUTO: 0.05 X10*3/UL (ref 0–0.1)
BASOPHILS NFR BLD AUTO: 0.9 %
BILIRUB SERPL-MCNC: 0.4 MG/DL (ref 0–1.2)
BUN SERPL-MCNC: 21 MG/DL (ref 6–23)
CALCIUM SERPL-MCNC: 10 MG/DL (ref 8.6–10.3)
CHLORIDE SERPL-SCNC: 105 MMOL/L (ref 98–107)
CHOLEST SERPL-MCNC: 171 MG/DL (ref 0–199)
CHOLESTEROL/HDL RATIO: 2.6
CO2 SERPL-SCNC: 27 MMOL/L (ref 21–32)
CREAT SERPL-MCNC: 0.67 MG/DL (ref 0.5–1.05)
EGFRCR SERPLBLD CKD-EPI 2021: >90 ML/MIN/1.73M*2
EOSINOPHIL # BLD AUTO: 0.2 X10*3/UL (ref 0–0.7)
EOSINOPHIL NFR BLD AUTO: 3.4 %
ERYTHROCYTE [DISTWIDTH] IN BLOOD BY AUTOMATED COUNT: 14.1 % (ref 11.5–14.5)
EST. AVERAGE GLUCOSE BLD GHB EST-MCNC: 123 MG/DL
GLUCOSE SERPL-MCNC: 92 MG/DL (ref 74–99)
HBA1C MFR BLD: 5.9 %
HCT VFR BLD AUTO: 40.4 % (ref 36–46)
HDLC SERPL-MCNC: 66.2 MG/DL
HGB BLD-MCNC: 13.1 G/DL (ref 12–16)
IMM GRANULOCYTES # BLD AUTO: 0.01 X10*3/UL (ref 0–0.7)
IMM GRANULOCYTES NFR BLD AUTO: 0.2 % (ref 0–0.9)
LDLC SERPL CALC-MCNC: 86 MG/DL
LYMPHOCYTES # BLD AUTO: 2.8 X10*3/UL (ref 1.2–4.8)
LYMPHOCYTES NFR BLD AUTO: 48 %
MCH RBC QN AUTO: 30.3 PG (ref 26–34)
MCHC RBC AUTO-ENTMCNC: 32.4 G/DL (ref 32–36)
MCV RBC AUTO: 94 FL (ref 80–100)
MONOCYTES # BLD AUTO: 0.38 X10*3/UL (ref 0.1–1)
MONOCYTES NFR BLD AUTO: 6.5 %
NEUTROPHILS # BLD AUTO: 2.39 X10*3/UL (ref 1.2–7.7)
NEUTROPHILS NFR BLD AUTO: 41 %
NON HDL CHOLESTEROL: 105 MG/DL (ref 0–149)
NRBC BLD-RTO: 0 /100 WBCS (ref 0–0)
PLATELET # BLD AUTO: 237 X10*3/UL (ref 150–450)
POTASSIUM SERPL-SCNC: 4.2 MMOL/L (ref 3.5–5.3)
PROT SERPL-MCNC: 7.3 G/DL (ref 6.4–8.2)
RBC # BLD AUTO: 4.32 X10*6/UL (ref 4–5.2)
SODIUM SERPL-SCNC: 140 MMOL/L (ref 136–145)
T4 FREE SERPL-MCNC: 1.01 NG/DL (ref 0.61–1.12)
TRIGL SERPL-MCNC: 96 MG/DL (ref 0–149)
TSH SERPL-ACNC: 0.24 MIU/L (ref 0.44–3.98)
VLDL: 19 MG/DL (ref 0–40)
WBC # BLD AUTO: 5.8 X10*3/UL (ref 4.4–11.3)

## 2024-05-21 PROCEDURE — 80061 LIPID PANEL: CPT

## 2024-05-21 PROCEDURE — 82306 VITAMIN D 25 HYDROXY: CPT

## 2024-05-21 PROCEDURE — 36415 COLL VENOUS BLD VENIPUNCTURE: CPT

## 2024-05-21 PROCEDURE — 84443 ASSAY THYROID STIM HORMONE: CPT

## 2024-05-21 PROCEDURE — 84439 ASSAY OF FREE THYROXINE: CPT

## 2024-05-21 PROCEDURE — 80053 COMPREHEN METABOLIC PANEL: CPT

## 2024-05-21 PROCEDURE — 85025 COMPLETE CBC W/AUTO DIFF WBC: CPT

## 2024-05-21 PROCEDURE — 83036 HEMOGLOBIN GLYCOSYLATED A1C: CPT

## 2024-06-13 ENCOUNTER — APPOINTMENT (OUTPATIENT)
Dept: NEUROLOGY | Facility: CLINIC | Age: 64
End: 2024-06-13
Payer: COMMERCIAL

## 2024-06-13 VITALS
SYSTOLIC BLOOD PRESSURE: 124 MMHG | WEIGHT: 150.34 LBS | TEMPERATURE: 97.8 F | DIASTOLIC BLOOD PRESSURE: 70 MMHG | HEART RATE: 97 BPM | BODY MASS INDEX: 25.05 KG/M2 | HEIGHT: 65 IN

## 2024-06-13 DIAGNOSIS — R41.3 MEMORY CHANGE: Primary | ICD-10-CM

## 2024-06-13 PROCEDURE — 99205 OFFICE O/P NEW HI 60 MIN: CPT | Performed by: STUDENT IN AN ORGANIZED HEALTH CARE EDUCATION/TRAINING PROGRAM

## 2024-06-13 ASSESSMENT — PAIN SCALES - GENERAL: PAINLEVEL: 0-NO PAIN

## 2024-06-13 ASSESSMENT — MONTREAL COGNITIVE ASSESSMENT (MOCA)
WHAT IS THE TOTAL SCORE (OUT OF 30): 28
12. MEMORY INDEX SCORE: 3
9. REPEAT EACH SENTENCE: 2
4. NAME EACH OF THE THREE ANIMALS SHOWN: 3
5. MEMORY TRIALS: 0
13. ORIENTATION SUBSCORE: 6
10. [FLUENCY] NAME WORDS STARTING WITH DESIGNATED LETTER: 1
VISUOSPATIAL/EXECUTIVE SUBSCORE: 5
11. FOR EACH PAIR OF WORDS, WHAT CATEGORY DO THEY BELONG TO (OUT OF 2): 2
7. [VIGILENCE] TAP WHEN HEARING DESIGNATED LETTER: 1
8. SERIAL SUBTRACTION OF 7S: 3
WHAT LEVEL OF EDUCATION WAS ATTAINED: 0
6. READ LIST OF DIGITS [FORWARD/BACKWARD]: 2

## 2024-06-13 NOTE — PATIENT INSTRUCTIONS
It was a pleasure seeing you today.    I have ordered blood work to check vitamin levels. In addition we can consider additional testing for cognition called neuropsychological testing. As we discussed you are going to check with your insurance to see if it would be cover. You can call my office if you decide to proceed.    Tips for brain health:  1. Mediterranean diet: green leafy veggies, berries, fish, olive oil. Minimize fried foods or processed sugars. Limit alcohol intake.  2. Brain exercises such as Lumosity, word puzzles, word games, card games.  3. Keep notepad to write things down. Make lists and follow them.  4.Engage in 20-30 minutes daily physical exercise: Walking, light weights, swimming, etc.  5. Get at least 7-9 hours sleep nightly.  6. Social engagement and having a community and support system will help keep your mind active and healthy.  7. If you feel depressed or down, reach out and get help.    If you have any questions or concerns please call my office at [phone].

## 2024-09-03 DIAGNOSIS — G43.009 MIGRAINE WITHOUT AURA AND WITHOUT STATUS MIGRAINOSUS, NOT INTRACTABLE: ICD-10-CM

## 2024-09-03 DIAGNOSIS — E78.5 HYPERLIPIDEMIA, UNSPECIFIED HYPERLIPIDEMIA TYPE: ICD-10-CM

## 2024-09-04 RX ORDER — SUMATRIPTAN SUCCINATE 100 MG/1
100 TABLET ORAL ONCE AS NEEDED
Qty: 9 TABLET | Refills: 2 | Status: CANCELLED | OUTPATIENT
Start: 2024-09-04

## 2024-09-04 RX ORDER — SIMVASTATIN 20 MG/1
20 TABLET, FILM COATED ORAL NIGHTLY
Qty: 90 TABLET | Refills: 1 | Status: CANCELLED | OUTPATIENT
Start: 2024-09-04

## 2024-09-24 DIAGNOSIS — E03.8 HYPOTHYROIDISM DUE TO HASHIMOTO'S THYROIDITIS: ICD-10-CM

## 2024-09-24 DIAGNOSIS — E78.5 HYPERLIPIDEMIA, UNSPECIFIED HYPERLIPIDEMIA TYPE: ICD-10-CM

## 2024-09-24 DIAGNOSIS — E06.3 HYPOTHYROIDISM DUE TO HASHIMOTO'S THYROIDITIS: ICD-10-CM

## 2024-09-24 DIAGNOSIS — G43.009 MIGRAINE WITHOUT AURA AND WITHOUT STATUS MIGRAINOSUS, NOT INTRACTABLE: ICD-10-CM

## 2024-09-25 RX ORDER — SIMVASTATIN 20 MG/1
20 TABLET, FILM COATED ORAL NIGHTLY
Qty: 90 TABLET | Refills: 0 | Status: SHIPPED | OUTPATIENT
Start: 2024-09-25

## 2024-09-25 RX ORDER — LEVOTHYROXINE SODIUM 125 UG/1
125 TABLET ORAL DAILY
Qty: 90 TABLET | Refills: 0 | Status: SHIPPED | OUTPATIENT
Start: 2024-09-25

## 2024-09-25 RX ORDER — SUMATRIPTAN SUCCINATE 100 MG/1
100 TABLET ORAL ONCE AS NEEDED
Qty: 9 TABLET | Refills: 0 | Status: SHIPPED | OUTPATIENT
Start: 2024-09-25

## 2024-10-08 PROBLEM — E06.3 HASHIMOTO'S THYROIDITIS: Status: ACTIVE | Noted: 2024-10-08

## 2024-10-08 PROBLEM — M70.60 GREATER TROCHANTERIC BURSITIS: Status: ACTIVE | Noted: 2024-10-08

## 2024-10-08 PROBLEM — G83.10 PARESIS OF SINGLE LOWER EXTREMITY (MULTI): Status: ACTIVE | Noted: 2024-10-08

## 2024-10-10 ENCOUNTER — LAB (OUTPATIENT)
Dept: LAB | Facility: LAB | Age: 64
End: 2024-10-10
Payer: COMMERCIAL

## 2024-10-10 ENCOUNTER — APPOINTMENT (OUTPATIENT)
Dept: PRIMARY CARE | Facility: CLINIC | Age: 64
End: 2024-10-10
Payer: COMMERCIAL

## 2024-10-10 VITALS
BODY MASS INDEX: 26.56 KG/M2 | OXYGEN SATURATION: 94 % | HEART RATE: 96 BPM | DIASTOLIC BLOOD PRESSURE: 82 MMHG | WEIGHT: 159.4 LBS | HEIGHT: 65 IN | SYSTOLIC BLOOD PRESSURE: 124 MMHG

## 2024-10-10 DIAGNOSIS — Z12.31 SCREENING MAMMOGRAM FOR BREAST CANCER: ICD-10-CM

## 2024-10-10 DIAGNOSIS — R10.84 GENERALIZED ABDOMINAL DISCOMFORT: ICD-10-CM

## 2024-10-10 DIAGNOSIS — J30.2 SEASONAL ALLERGIC RHINITIS, UNSPECIFIED TRIGGER: ICD-10-CM

## 2024-10-10 DIAGNOSIS — R41.3 MEMORY CHANGE: ICD-10-CM

## 2024-10-10 DIAGNOSIS — E06.3 HYPOTHYROIDISM DUE TO HASHIMOTO'S THYROIDITIS: ICD-10-CM

## 2024-10-10 DIAGNOSIS — Z00.00 ROUTINE ADULT HEALTH MAINTENANCE: Primary | ICD-10-CM

## 2024-10-10 DIAGNOSIS — E78.5 HYPERLIPIDEMIA, UNSPECIFIED HYPERLIPIDEMIA TYPE: ICD-10-CM

## 2024-10-10 DIAGNOSIS — Z00.00 ROUTINE ADULT HEALTH MAINTENANCE: ICD-10-CM

## 2024-10-10 LAB — TSH SERPL-ACNC: 0.57 MIU/L (ref 0.44–3.98)

## 2024-10-10 PROCEDURE — 3008F BODY MASS INDEX DOCD: CPT | Performed by: NURSE PRACTITIONER

## 2024-10-10 PROCEDURE — 36415 COLL VENOUS BLD VENIPUNCTURE: CPT

## 2024-10-10 PROCEDURE — 99214 OFFICE O/P EST MOD 30 MIN: CPT | Performed by: NURSE PRACTITIONER

## 2024-10-10 PROCEDURE — 84443 ASSAY THYROID STIM HORMONE: CPT

## 2024-10-10 PROCEDURE — 83036 HEMOGLOBIN GLYCOSYLATED A1C: CPT

## 2024-10-10 PROCEDURE — 82607 VITAMIN B-12: CPT

## 2024-10-10 ASSESSMENT — ENCOUNTER SYMPTOMS
COUGH: 0
SLEEP DISTURBANCE: 1
WEAKNESS: 0
NAUSEA: 0
SHORTNESS OF BREATH: 0
ABDOMINAL PAIN: 0
WOUND: 0
COLOR CHANGE: 0
DIARRHEA: 0
CONSTIPATION: 1
DIZZINESS: 0
DIFFICULTY URINATING: 0
CHILLS: 0
PALPITATIONS: 0
TROUBLE SWALLOWING: 0
ABDOMINAL DISTENTION: 0
MYALGIAS: 0
BACK PAIN: 0
EYE PAIN: 0
WHEEZING: 0
HEADACHES: 1
FEVER: 0
SEIZURES: 0
JOINT SWELLING: 0
ADENOPATHY: 0
BRUISES/BLEEDS EASILY: 0
FATIGUE: 0

## 2024-10-10 ASSESSMENT — COLUMBIA-SUICIDE SEVERITY RATING SCALE - C-SSRS
1. IN THE PAST MONTH, HAVE YOU WISHED YOU WERE DEAD OR WISHED YOU COULD GO TO SLEEP AND NOT WAKE UP?: NO
6. HAVE YOU EVER DONE ANYTHING, STARTED TO DO ANYTHING, OR PREPARED TO DO ANYTHING TO END YOUR LIFE?: NO
2. HAVE YOU ACTUALLY HAD ANY THOUGHTS OF KILLING YOURSELF?: NO

## 2024-10-10 NOTE — ASSESSMENT & PLAN NOTE
TSH levels orders today for reassessment purposes. Patient to continue current levothyroxine dosing for now

## 2024-10-10 NOTE — PATIENT INSTRUCTIONS
Consider keeping a log of the foods you eat and how it affects your stomach discomfort Trial over the counter lactaid pills prior to dairy foods. Notify the office for any persistent or worsening stomach concerns    Please arrange for follow up in 6 months

## 2024-10-10 NOTE — ASSESSMENT & PLAN NOTE
Unknown etiology. EGD in 2021 showed chronic gastritis. Patient continues on daily PPI. Colonoscopy was reported to be incomplete.  Secondary to tortuous colon?  Painful gas, abdominal pain and tenderness could be symptoms of this.  Secondary to dairy? Encouraged patient to keep a food diary of triggering foods and also trial Lactaid pills with dairy consumption. Provider to be notified for any persistent or worsening ABD symptoms. Consider GI follow-up

## 2024-10-10 NOTE — ASSESSMENT & PLAN NOTE
Reported well-controlled with Allegra and Astelin nasal spray.  Patient has seen ENT in the past for recommendation purposes.  She may continue to follow-up as needed.

## 2024-10-10 NOTE — PROGRESS NOTES
Subjective   Patient ID: Eduarda Foss is a 64 y.o. female who presents for Follow-up (Med review).    Patient seen today in order to establish primary care.  Patient seen sitting up in office, in no acute distress.  Patient is alert, oriented and appears in fair spirits.  Patient reports issues with stomach cramping, aches and increase gas/bloating.  She is states that this has been going on for several years and typically occurs in the mornings or after meals.  She denies any associated nausea, vomiting, diarrhea.  Intermittent constipation controlled with MiraLAX.  She does not believe that it is correlated to food and she reports no significant improvement with PPI.  Patient states her symptoms typically improve after passing gas.  Incomplete colonoscopy attempted in 2021.  Patient reports torturous bowels which made the scope very difficult.  Patient denies any blood/mucus in stools, weight loss or other GI concerns.  Patient states that although the symptoms have persisted they have not worsened over time.  Patient denies any issues with appetite, staying hydrated or bladder.  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  She reports playing ball with associated shortness of breath or chest pain with exertion patient is following with neurology due to concerns of cognitive changes.  Additional testing ordered for assessment purposes.  Patient also does report occasional migraine headaches which are relatively well-controlled with as needed triptan use.  Patient does not smoke cigarettes but does admit to but seems to be routine cannabis use.  She states she utilizes it for insomnia.  No reported issues with mood.  Good support system reported.  Patient denies any current vision or dental concerns.  Medications reviewed.  No other acute concerns voiced at this time.           Current Outpatient Medications on File Prior to Visit   Medication Sig Dispense Refill    azelastine (Astelin) 137 mcg  (0.1 %) nasal spray Administer 2 sprays into each nostril 2 times a day. 90 mL 1    cholecalciferol (Vitamin D-3) 125 MCG (5000 UT) capsule Take by mouth.      fexofenadine (Allegra) 180 mg tablet Take 1 tablet (180 mg) by mouth once daily as needed.      levothyroxine (Synthroid, Levoxyl) 125 mcg tablet Take 1 tablet (125 mcg) by mouth once daily. 90 tablet 0    multivitamin tablet Take 1 tablet by mouth once daily.      omeprazole (PriLOSEC) 40 mg DR capsule Take 1 capsule (40 mg) by mouth once daily.      simvastatin (Zocor) 20 mg tablet Take 1 tablet (20 mg) by mouth once daily at bedtime. 90 tablet 0    SUMAtriptan (Imitrex) 100 mg tablet Take 1 tablet (100 mg) by mouth 1 time if needed for migraine for up to 27 doses. 9 tablet 0    zinc gluconate 100 mg tablet Take by mouth.      [DISCONTINUED] ascorbic acid (Vitamin C) 1,000 mg tablet Take by mouth.       No current facility-administered medications on file prior to visit.       Past Medical History:   Diagnosis Date    Allergic     Disease of thyroid gland 1997    GERD (gastroesophageal reflux disease)     Migraine     Nasal congestion 04/06/2021        Past Surgical History:   Procedure Laterality Date    WISDOM TOOTH EXTRACTION  1986        Family History   Problem Relation Name Age of Onset    Heart disease Mother Cheyenne Hutchinson     Hypothyroidism Mother Cheyenne Hutchinson     Osteoporosis Mother Cheyenne Hutchinson     Arthritis Mother Cheyenne Hutchinson     Cancer Father Thang Hutchinson     Diabetes Father Thang Hutchinson     Hypothyroidism Brother      Allergies Other          Review of Systems   Constitutional:  Negative for chills, fatigue and fever.   HENT:  Negative for dental problem and trouble swallowing.    Eyes:  Negative for pain and visual disturbance.        Wears glasses   Respiratory:  Negative for cough, shortness of breath and wheezing.    Cardiovascular:  Negative for chest pain, palpitations and leg swelling.   Gastrointestinal:  Positive for constipation.  "Negative for abdominal distention, abdominal pain, diarrhea and nausea.        Positive for intermittent issues with increased gas/cramping.  Constipation controlled with MiraLAX   Endocrine: Negative for cold intolerance and heat intolerance.   Genitourinary:  Negative for difficulty urinating.   Musculoskeletal:  Negative for back pain, gait problem, joint swelling and myalgias.   Skin:  Negative for color change, pallor, rash and wound.   Allergic/Immunologic: Positive for environmental allergies. Negative for food allergies.   Neurological:  Positive for headaches. Negative for dizziness, seizures and weakness.        Positive for occasional migraine headache   Hematological:  Negative for adenopathy. Does not bruise/bleed easily.   Psychiatric/Behavioral:  Positive for sleep disturbance. Negative for behavioral problems.    All other systems reviewed and are negative.      Objective   /82   Pulse 96   Ht 1.651 m (5' 5\")   Wt 72.3 kg (159 lb 6.4 oz)   SpO2 94%   BMI 26.53 kg/m²     Physical Exam  Constitutional:       General: She is not in acute distress.     Appearance: Normal appearance. She is not toxic-appearing.   HENT:      Head: Normocephalic and atraumatic.      Right Ear: Tympanic membrane, ear canal and external ear normal.      Left Ear: Tympanic membrane, ear canal and external ear normal.      Nose: Nose normal.      Mouth/Throat:      Mouth: Mucous membranes are moist.      Pharynx: Oropharynx is clear.   Eyes:      Extraocular Movements: Extraocular movements intact.      Conjunctiva/sclera: Conjunctivae normal.      Pupils: Pupils are equal, round, and reactive to light.   Cardiovascular:      Rate and Rhythm: Normal rate and regular rhythm.      Pulses: Normal pulses.      Heart sounds: Normal heart sounds. No murmur heard.  Pulmonary:      Effort: Pulmonary effort is normal.      Breath sounds: Normal breath sounds. No wheezing.   Abdominal:      General: Bowel sounds are normal.    "   Palpations: Abdomen is soft.   Musculoskeletal:         General: No swelling. Normal range of motion.      Cervical back: Normal range of motion and neck supple.   Skin:     General: Skin is warm and dry.   Neurological:      General: No focal deficit present.      Mental Status: She is alert and oriented to person, place, and time. Mental status is at baseline.      Cranial Nerves: No cranial nerve deficit.      Motor: No weakness.   Psychiatric:         Mood and Affect: Mood normal.         Behavior: Behavior normal.         Thought Content: Thought content normal.         Judgment: Judgment normal.         Assessment/Plan   Problem List Items Addressed This Visit             ICD-10-CM    Hyperlipidemia E78.5     Patient continues on simvastatin without issue.  Will continue to monitor lipid panel routinely         Hypothyroidism due to Hashimoto's thyroiditis E06.3     TSH levels orders today for reassessment purposes. Patient to continue current levothyroxine dosing for now         Seasonal allergic rhinitis J30.2     Reported well-controlled with Allegra and Astelin nasal spray.  Patient has seen ENT in the past for recommendation purposes.  She may continue to follow-up as needed.         Routine adult health maintenance - Primary Z00.00     Additional blood work ordered today for assessment purposes  -Patient reports undergoing mammogram earlier this year at an outside facility and that she will bring a copy into the office for review  -EGD/ Colonoscopy completed in 2021  -PAP/Pelvic completed in 2022         Relevant Orders    Hemoglobin A1C (Completed)    TSH with reflex to Free T4 if abnormal (Completed)    Generalized abdominal discomfort R10.84     Unknown etiology. EGD in 2021 showed chronic gastritis. Patient continues on daily PPI. Colonoscopy was reported to be incomplete.  Secondary to tortuous colon?  Painful gas, abdominal pain and tenderness could be symptoms of this.  Secondary to dairy?  Encouraged patient to keep a food diary of triggering foods and also trial Lactaid pills with dairy consumption. Provider to be notified for any persistent or worsening ABD symptoms. Consider GI follow-up          Other Visit Diagnoses         Codes    Screening mammogram for breast cancer     Z12.31

## 2024-10-10 NOTE — ASSESSMENT & PLAN NOTE
Additional blood work ordered today for assessment purposes  -Patient reports undergoing mammogram earlier this year at an outside facility and that she will bring a copy into the office for review  -EGD/ Colonoscopy completed in 2021  -PAP/Pelvic completed in 2022

## 2024-10-11 LAB
EST. AVERAGE GLUCOSE BLD GHB EST-MCNC: 114 MG/DL
HBA1C MFR BLD: 5.6 %
VIT B12 SERPL-MCNC: 340 PG/ML (ref 211–911)

## 2024-11-12 ENCOUNTER — APPOINTMENT (OUTPATIENT)
Dept: PRIMARY CARE | Facility: CLINIC | Age: 64
End: 2024-11-12
Payer: COMMERCIAL

## 2024-11-19 ENCOUNTER — APPOINTMENT (OUTPATIENT)
Dept: PRIMARY CARE | Facility: CLINIC | Age: 64
End: 2024-11-19
Payer: COMMERCIAL

## 2024-11-19 ENCOUNTER — LAB (OUTPATIENT)
Dept: LAB | Facility: LAB | Age: 64
End: 2024-11-19
Payer: COMMERCIAL

## 2024-11-19 VITALS
HEIGHT: 65 IN | WEIGHT: 160.4 LBS | SYSTOLIC BLOOD PRESSURE: 126 MMHG | DIASTOLIC BLOOD PRESSURE: 74 MMHG | BODY MASS INDEX: 26.73 KG/M2 | HEART RATE: 85 BPM | OXYGEN SATURATION: 97 %

## 2024-11-19 DIAGNOSIS — K21.9 CHRONIC GERD: ICD-10-CM

## 2024-11-19 DIAGNOSIS — E78.5 HYPERLIPIDEMIA, UNSPECIFIED HYPERLIPIDEMIA TYPE: ICD-10-CM

## 2024-11-19 DIAGNOSIS — E06.3 HYPOTHYROIDISM DUE TO HASHIMOTO'S THYROIDITIS: ICD-10-CM

## 2024-11-19 DIAGNOSIS — G43.009 MIGRAINE WITHOUT AURA AND WITHOUT STATUS MIGRAINOSUS, NOT INTRACTABLE: ICD-10-CM

## 2024-11-19 DIAGNOSIS — M54.2 NECK PAIN ON LEFT SIDE: ICD-10-CM

## 2024-11-19 DIAGNOSIS — R14.0 ABDOMINAL BLOATING: Primary | ICD-10-CM

## 2024-11-19 DIAGNOSIS — R10.84 GENERALIZED ABDOMINAL DISCOMFORT: ICD-10-CM

## 2024-11-19 DIAGNOSIS — R14.0 ABDOMINAL BLOATING: ICD-10-CM

## 2024-11-19 DIAGNOSIS — K29.50 CHRONIC GASTRITIS, PRESENCE OF BLEEDING UNSPECIFIED, UNSPECIFIED GASTRITIS TYPE: ICD-10-CM

## 2024-11-19 DIAGNOSIS — Z00.00 ROUTINE ADULT HEALTH MAINTENANCE: ICD-10-CM

## 2024-11-19 LAB
GLIADIN PEPTIDE IGA SER IA-ACNC: <1 U/ML
TTG IGA SER IA-ACNC: <1 U/ML

## 2024-11-19 PROCEDURE — 99214 OFFICE O/P EST MOD 30 MIN: CPT | Performed by: NURSE PRACTITIONER

## 2024-11-19 PROCEDURE — 1036F TOBACCO NON-USER: CPT | Performed by: NURSE PRACTITIONER

## 2024-11-19 PROCEDURE — 83516 IMMUNOASSAY NONANTIBODY: CPT

## 2024-11-19 PROCEDURE — 86003 ALLG SPEC IGE CRUDE XTRC EA: CPT

## 2024-11-19 PROCEDURE — 3008F BODY MASS INDEX DOCD: CPT | Performed by: NURSE PRACTITIONER

## 2024-11-19 PROCEDURE — 36415 COLL VENOUS BLD VENIPUNCTURE: CPT

## 2024-11-19 RX ORDER — SIMVASTATIN 20 MG/1
20 TABLET, FILM COATED ORAL NIGHTLY
Qty: 90 TABLET | Refills: 0 | Status: SHIPPED | OUTPATIENT
Start: 2024-11-19

## 2024-11-19 RX ORDER — FEXOFENADINE HCL AND PSEUDOEPHEDRINE HCL 180; 240 MG/1; MG/1
1 TABLET, EXTENDED RELEASE ORAL DAILY PRN
COMMUNITY
End: 2024-11-19 | Stop reason: SDUPTHER

## 2024-11-19 RX ORDER — SUMATRIPTAN SUCCINATE 100 MG/1
100 TABLET ORAL ONCE AS NEEDED
Qty: 9 TABLET | Refills: 0 | Status: SHIPPED | OUTPATIENT
Start: 2024-11-19

## 2024-11-19 RX ORDER — LEVOTHYROXINE SODIUM 125 UG/1
125 TABLET ORAL DAILY
Qty: 90 TABLET | Refills: 0 | Status: SHIPPED | OUTPATIENT
Start: 2024-11-19

## 2024-11-19 RX ORDER — OMEPRAZOLE 40 MG/1
40 CAPSULE, DELAYED RELEASE ORAL DAILY
Qty: 90 CAPSULE | Refills: 3 | Status: SHIPPED | OUTPATIENT
Start: 2024-11-19 | End: 2025-11-19

## 2024-11-19 ASSESSMENT — ENCOUNTER SYMPTOMS
TROUBLE SWALLOWING: 0
ABDOMINAL DISTENTION: 0
DIFFICULTY URINATING: 0
WOUND: 0
WEAKNESS: 0
CONSTIPATION: 1
FATIGUE: 0
DIZZINESS: 0
COUGH: 0
HEADACHES: 1
PALPITATIONS: 0
MYALGIAS: 0
COLOR CHANGE: 0
NECK PAIN: 1
ABDOMINAL PAIN: 0
ADENOPATHY: 0
SLEEP DISTURBANCE: 1
CHILLS: 0
EYE PAIN: 0
DIARRHEA: 0
BRUISES/BLEEDS EASILY: 0
SHORTNESS OF BREATH: 0
WHEEZING: 0
SEIZURES: 0
FEVER: 0
JOINT SWELLING: 0
BACK PAIN: 0
NAUSEA: 0

## 2024-11-19 NOTE — ASSESSMENT & PLAN NOTE
No pain with palpation or movement.  Is reported to be stabbing and intermittent in nature.  Unknown etiology?  Do not feel that x-ray would be helpful in assessing underlying pathology.  Patient instructed to keep a log of any triggering factors.  Consider ultrasound of underlying structures if pain continues?  Provider to be notified for any persistent/worsening pain concerns or any associated neurological symptoms.

## 2024-11-19 NOTE — PROGRESS NOTES
"Subjective   Patient ID: Eduarda Foss is a 64 y.o. female who presents for Neck Pain (Not when she moves her neck but has off and on jabbing pain on the left side of her neck ) and Abdominal Pain (Ingestion issues ).    Patient seen today to discuss neck pain and abdominal cramping.  Patient reports that she has had intermittent issues with left-sided neck pain for the past several months.  She denies any known injury or trauma.  Pain appears to be very localized on the left side of her neck and pain is \"stabbing\" in nature.  She states that last for about a minute at a time and then resolved spontaneously.  She states that this occurs several times a week.  Patient denies any known triggers and is unaware if the pain has been occurring more frequently or not over the past several weeks.  Patient denies any associated neurological issues associated with the pain such as blurred vision/numbness/tingling.  Patient is again reports issues with stomach cramping, aches and increase gas/bloating.  She is states that this has been going on for several years and typically occurs in the mornings or after meals.  She denies any associated nausea, vomiting, diarrhea.  Intermittent constipation controlled with MiraLAX.  She does not believe that it is correlated to food and she reports no significant improvement with PPI.  Patient does report that she has only been taking 20 mg of omeprazole and does not take it daily as prescribed.  Patient states her symptoms typically improve after passing gas.  Incomplete colonoscopy attempted in 2021.  Patient reports torturous bowels which made the scope very difficult.  Patient denies any blood/mucus in stools, weight loss or other GI concerns.  Patient states that although the symptoms have persisted they have not worsened over time.  At previous visit, patient was instructed to keep a food diary but has not done so as of yet.  She does not appear to be interested in following up with " gastroenterology just yet.  Patient denies any issues with appetite, staying hydrated or bladder.  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  She reports playing ball with associated shortness of breath or chest pain with exertion patient is following with neurology due to concerns of cognitive changes. Patient also does report occasional migraine headaches which are relatively well-controlled with as needed triptan use.  Patient does not smoke cigarettes but does admit to but seems to be routine cannabis use.  Medications reviewed.  No other acute concerns voiced at this time.           Current Outpatient Medications on File Prior to Visit   Medication Sig Dispense Refill    azelastine (Astelin) 137 mcg (0.1 %) nasal spray Administer 2 sprays into each nostril 2 times a day. 90 mL 1    cholecalciferol (Vitamin D-3) 125 MCG (5000 UT) capsule Take by mouth.      fexofenadine (Allegra) 180 mg tablet Take 1 tablet (180 mg) by mouth once daily as needed.      multivitamin tablet Take 1 tablet by mouth once daily.      zinc gluconate 100 mg tablet Take by mouth.      [DISCONTINUED] levothyroxine (Synthroid, Levoxyl) 125 mcg tablet Take 1 tablet (125 mcg) by mouth once daily. 90 tablet 0    [DISCONTINUED] omeprazole (PriLOSEC) 40 mg DR capsule Take 1 capsule (40 mg) by mouth once daily.      [DISCONTINUED] simvastatin (Zocor) 20 mg tablet Take 1 tablet (20 mg) by mouth once daily at bedtime. 90 tablet 0    [DISCONTINUED] SUMAtriptan (Imitrex) 100 mg tablet Take 1 tablet (100 mg) by mouth 1 time if needed for migraine for up to 27 doses. 9 tablet 0    [DISCONTINUED] fexofenadine-pseudoephedrine (Allegra-D 24) 180-240 mg 24 hr tablet Take 1 tablet by mouth once daily as needed for allergies. Do not crush, chew, or split.       No current facility-administered medications on file prior to visit.       Past Medical History:   Diagnosis Date    Allergic     Disease of thyroid gland 1997    GERD  "(gastroesophageal reflux disease)     Migraine     Nasal congestion 04/06/2021        Past Surgical History:   Procedure Laterality Date    WISDOM TOOTH EXTRACTION  1986        Family History   Problem Relation Name Age of Onset    Heart disease Mother Cheyenne Hutchinson     Hypothyroidism Mother Cheyenne Hutchinson     Osteoporosis Mother Cheyenne Hutchinson     Arthritis Mother Cheyenne Hutchinson     Cancer Father Thang Hutchinson     Diabetes Father Thang Hutchinson     Hypothyroidism Brother      Allergies Other          Review of Systems   Constitutional:  Negative for chills, fatigue and fever.   HENT:  Negative for dental problem and trouble swallowing.    Eyes:  Negative for pain and visual disturbance.        Wears glasses   Respiratory:  Negative for cough, shortness of breath and wheezing.    Cardiovascular:  Negative for chest pain, palpitations and leg swelling.   Gastrointestinal:  Positive for constipation. Negative for abdominal distention, abdominal pain, diarrhea and nausea.        Positive for intermittent issues with increased gas/cramping.  Constipation controlled with MiraLAX   Endocrine: Negative for cold intolerance and heat intolerance.   Genitourinary:  Negative for difficulty urinating.   Musculoskeletal:  Positive for neck pain. Negative for back pain, gait problem, joint swelling and myalgias.        See HPI   Skin:  Negative for color change, pallor, rash and wound.   Allergic/Immunologic: Positive for environmental allergies. Negative for food allergies.   Neurological:  Positive for headaches. Negative for dizziness, seizures and weakness.        Positive for occasional migraine headache   Hematological:  Negative for adenopathy. Does not bruise/bleed easily.   Psychiatric/Behavioral:  Positive for sleep disturbance. Negative for behavioral problems.    All other systems reviewed and are negative.      Objective   /74   Pulse 85   Ht 1.651 m (5' 5\")   Wt 72.8 kg (160 lb 6.4 oz)   SpO2 97%   BMI 26.69 kg/m² "     Physical Exam  Constitutional:       General: She is not in acute distress.     Appearance: Normal appearance. She is not toxic-appearing.   HENT:      Head: Normocephalic and atraumatic.      Right Ear: External ear normal.      Left Ear: External ear normal.      Nose: Nose normal.      Mouth/Throat:      Mouth: Mucous membranes are moist.      Pharynx: Oropharynx is clear.   Eyes:      Extraocular Movements: Extraocular movements intact.      Conjunctiva/sclera: Conjunctivae normal.   Neck:      Vascular: No carotid bruit.   Cardiovascular:      Rate and Rhythm: Normal rate and regular rhythm.      Pulses: Normal pulses.      Heart sounds: Normal heart sounds. No murmur heard.  Pulmonary:      Effort: Pulmonary effort is normal.      Breath sounds: Normal breath sounds. No wheezing.   Abdominal:      General: Bowel sounds are normal.      Palpations: Abdomen is soft.   Musculoskeletal:         General: No swelling. Normal range of motion.      Cervical back: Normal range of motion and neck supple. No pain with movement. Normal range of motion.   Skin:     General: Skin is warm and dry.   Neurological:      General: No focal deficit present.      Mental Status: She is alert and oriented to person, place, and time. Mental status is at baseline.      Cranial Nerves: No cranial nerve deficit.      Motor: No weakness.   Psychiatric:         Mood and Affect: Mood normal.         Behavior: Behavior normal.         Thought Content: Thought content normal.         Judgment: Judgment normal.         Assessment/Plan   Problem List Items Addressed This Visit             ICD-10-CM    Chronic GERD K21.9     Encouraged patient to take her omeprazole daily as well and increase her dose to 40 mg as prescribed for symptom control.         Hyperlipidemia E78.5     Patient continues on simvastatin without issue.  Will continue to monitor lipid panel routinely         Relevant Medications    simvastatin (Zocor) 20 mg tablet     Hypothyroidism due to Hashimoto's thyroiditis E06.3     Lab Results   Component Value Date    TSH 0.57 10/10/2024      Patient to continue current levothyroxine dosing.  Will continue to monitor TSH levels routinely           Relevant Medications    levothyroxine (Synthroid, Levoxyl) 125 mcg tablet    Migraine without aura and without status migrainosus, not intractable G43.009    Relevant Medications    SUMAtriptan (Imitrex) 100 mg tablet    Routine adult health maintenance Z00.00     Additional blood work ordered today for assessment purposes  -Patient reports undergoing mammogram earlier this year at an outside facility and that she will bring a copy into the office for review  -EGD/ Colonoscopy completed in 2021  -PAP/Pelvic completed in 2022         Generalized abdominal discomfort R10.84     Unknown etiology. EGD in 2021 showed chronic gastritis. Patient continues on PPI but not daily. Colonoscopy was reported to be incomplete.  Secondary to tortuous colon?  Painful gas, abdominal pain and tenderness could be symptoms of this.  Secondary to dairy? Encouraged patient to keep a food diary of triggering foods.  Additional blood work also ordered today for assessment purposes.  Consider as needed simethicone and/for probiotic use?  Provider to be notified for any persistent or worsening ABD symptoms. Consider GI follow-up         Neck pain on left side M54.2     No pain with palpation or movement.  Is reported to be stabbing and intermittent in nature.  Unknown etiology?  Do not feel that x-ray would be helpful in assessing underlying pathology.  Patient instructed to keep a log of any triggering factors.  Consider ultrasound of underlying structures if pain continues?  Provider to be notified for any persistent/worsening pain concerns or any associated neurological symptoms.          Other Visit Diagnoses         Codes    Abdominal bloating    -  Primary R14.0    Relevant Orders    Celiac Panel    Food Allergy  Profile IgE    Chronic gastritis, presence of bleeding unspecified, unspecified gastritis type     K29.50    Relevant Medications    omeprazole (PriLOSEC) 40 mg DR capsule

## 2024-11-19 NOTE — ASSESSMENT & PLAN NOTE
Encouraged patient to take her omeprazole daily as well and increase her dose to 40 mg as prescribed for symptom control.

## 2024-11-19 NOTE — ASSESSMENT & PLAN NOTE
Unknown etiology. EGD in 2021 showed chronic gastritis. Patient continues on PPI but not daily. Colonoscopy was reported to be incomplete.  Secondary to tortuous colon?  Painful gas, abdominal pain and tenderness could be symptoms of this.  Secondary to dairy? Encouraged patient to keep a food diary of triggering foods.  Additional blood work also ordered today for assessment purposes.  Consider as needed simethicone and/for probiotic use?  Provider to be notified for any persistent or worsening ABD symptoms. Consider GI follow-up

## 2024-11-19 NOTE — ASSESSMENT & PLAN NOTE
Lab Results   Component Value Date    TSH 0.57 10/10/2024      Patient to continue current levothyroxine dosing.  Will continue to monitor TSH levels routinely

## 2024-11-19 NOTE — PATIENT INSTRUCTIONS
Increase your omeprazole to 40mg daily    Start keep a food log of when you stomach starts to bother you    Also keep track of neck pain and any associated triggers.    Please arrange for follow up in 3 months to discuss your neck/ abdominal concerns. A follow up neck US may be needed as well as GI follow up

## 2024-11-20 LAB
CLAM IGE QN: <0.1 KU/L
CODFISH IGE QN: <0.1 KU/L
CORN IGE QN: <0.1
EGG WHITE IGE QN: <0.1 KU/L
MILK IGE QN: <0.1 KU/L
PEANUT IGE QN: <0.1 KU/L
SCALLOP IGE QN: <0.1 KU/L
SESAME SEED IGE QN: <0.1 KU/L
SHRIMP IGE QN: <0.1 KU/L
SOYBEAN IGE QN: <0.1 KU/L
WALNUT IGE QN: <0.1 KU/L
WHEAT IGE QN: <0.1 KU/L

## 2024-11-21 LAB
GLIADIN PEPTIDE IGG SER IA-ACNC: <0.56 FLU (ref 0–4.99)
TTG IGG SER IA-ACNC: <0.82 FLU (ref 0–4.99)

## 2024-12-16 NOTE — PROGRESS NOTES
"Date of Service: 12/18/2024  Patient: Eduarda Foss  MRN: [id number]  Referring Provider: No ref. provider found    HPI  Ms. Foss is a 64 y.o. year old female with 14 years of formal education and PMX of hypothyroidism, migraines who is following up for concerns of cognitive changes.     She has been noticing cognitive changes for about 2.5 years. Onset was gradual. Has the following cognitive symptoms:    She continues to have short term memory difficulty and sometimes forgets what she wants to say. Some word finding difficulties. Not worse than 6 months ago. Her  sometimes feels like she just isn't paying attention. Family has not commented on memory issues, but her daughters and  have joked that she has \"ADHD.\" She does report that her mind will race at times leading to impaired concentration.    Insight into cognitive changes? Yes    Mood: \"good\"    Appetite: pretty good    No change in personality, social disinhibition, change in dietary preferences, loss of empathy, stereotyped or repetitive behaviors.     No visual hallucinations, fluctuating cognition, tremors, REM sleep behavior disorder, falls.     Stretches and goes for walks regularly  Continues to reads regularly    Functional changes:   Sleep: Sleeping about 5-6 hours, started CBD gummies have helped. Sometimes takes an afternoon nap  Current living situation: Lives with   Driving: No issues  Finances: No issues  Cooking: Has to re-read recipes  ADLS: No issues   Medications: No issues     Neuropsychiatric symptoms:   Depression - denies depression, but has bouts of sadness due to the loss of her son. Appetite ok. No worthlessness/helplessness. No suicidal thoughts, intent or plans.   Anxiety - Denies.     Prior Work-up:  None    Past Neuropsychiatric History:  Handedness: right.  History of traumatic brain injury: None.   History of seizures: None  History of stroke: None.   Past psychiatric history: None    Mother with memory " issues in her 70s. No formal diagnosis.     Past Medical History:   Diagnosis Date    Allergic     Disease of thyroid gland 1997    GERD (gastroesophageal reflux disease)     Migraine     Nasal congestion 04/06/2021     Past Surgical History:   Procedure Laterality Date    WISDOM TOOTH EXTRACTION  1986     Social History     Tobacco Use    Smoking status: Former     Current packs/day: 0.00     Types: Cigarettes    Smokeless tobacco: Never   Substance Use Topics    Alcohol use: Yes     Alcohol/week: 18.0 standard drinks of alcohol     Types: 2 Glasses of wine, 1 Cans of beer, 6 Shots of liquor, 9 Standard drinks or equivalent per week     Worked as      No Known Allergies    Visit Vitals  OB Status Postmenopausal   Smoking Status Former     Objective      Exam:    NEUROLOGICAL EXAMINATION  Oriented. Language normal without aphasia.    Cranial nerves:  Lids symmetric.  No ptosis.  Extra-occular muscles are intact with normal alignment.  No nystagmus  Normal and symmetric facial strength.  Nasolabial folds symmetric  Hearing intact to conversation  Tongue midline    Motor:  Muscle bulk was normal in all extremities.  5/5 strength in all extremities  No abnormal movements    Gait:  Stable gait    Results:  Lab Results   Component Value Date    HGBA1C 5.6 10/10/2024     TSH 0.24, FT4 1.01    CBC:   Lab Results   Component Value Date    WBC 5.8 05/21/2024    HGB 13.1 05/21/2024    HCT 40.4 05/21/2024     05/21/2024     BMP:   Lab Results   Component Value Date     05/21/2024    K 4.2 05/21/2024     05/21/2024    CO2 27 05/21/2024    BUN 21 05/21/2024    CREATININE 0.67 05/21/2024    CALCIUM 10.0 05/21/2024     Impression:  Eduarda Foss is a 64 y.o. who is following up for memory concerns. She reports gradual worsening in memory over the past 2.5 years, but no change in the past 6 months.  Reports some word finding difficulty along with impaired concentration. MOCA today was  29/30, 28 six months ago.  She has a history of hypothyroidism but recent TSH is normal.  Vitamin B12 level normal.  We reviewed tips for brain health as listed below.    At this juncture, we discussed continuing monitoring and reevaluating in a year as there has not been any significant change. I do not think there is some impaired concentration that may also be contributing to information retention.  In the future if symptoms worsen we can consider neuropsychological testing.    She was provided with information for the APT web study. She will follow up in one year.     Tips for brain health were reviewed:  1. Mediterranean diet: green leafy veggies, berries, fish, olive oil. Minimize fried foods or processed sugars. Limit alcohol intake.  2. Brain exercises such as Lumosity, word puzzles, word games, card games.  3. Keep notepad to write things down. Make lists and follow them.  4.Engage in 20-30 minutes daily physical exercise: Walking, light weights, swimming, etc.  5. Get at least 7-9 hours sleep nightly.  6. Social engagement and having a community and support system will help keep your mind active and healthy.  7. If you feel depressed or down, reach out and get help.     Reviewed and approved by CARRIE DAVIS on 12/16/24 at 9:13 AM.    I personally spent 30 minutes on the day of the visit completing the review of the medical record and outside records, obtaining history and performing an appropriate physical exam, patient care, counseling and education, placing orders, independently reviewing results, communicating with the patient and other providers, coordinating care and performing appropriate clinical documentation.

## 2024-12-18 ENCOUNTER — APPOINTMENT (OUTPATIENT)
Dept: NEUROLOGY | Facility: CLINIC | Age: 64
End: 2024-12-18
Payer: COMMERCIAL

## 2024-12-18 VITALS
HEIGHT: 65 IN | HEART RATE: 74 BPM | SYSTOLIC BLOOD PRESSURE: 140 MMHG | BODY MASS INDEX: 26.49 KG/M2 | WEIGHT: 159 LBS | TEMPERATURE: 96.6 F | DIASTOLIC BLOOD PRESSURE: 102 MMHG

## 2024-12-18 DIAGNOSIS — R41.3 MEMORY CHANGE: Primary | ICD-10-CM

## 2024-12-18 PROCEDURE — 99214 OFFICE O/P EST MOD 30 MIN: CPT | Performed by: STUDENT IN AN ORGANIZED HEALTH CARE EDUCATION/TRAINING PROGRAM

## 2024-12-18 PROCEDURE — 1036F TOBACCO NON-USER: CPT | Performed by: STUDENT IN AN ORGANIZED HEALTH CARE EDUCATION/TRAINING PROGRAM

## 2024-12-18 PROCEDURE — 3008F BODY MASS INDEX DOCD: CPT | Performed by: STUDENT IN AN ORGANIZED HEALTH CARE EDUCATION/TRAINING PROGRAM

## 2024-12-18 ASSESSMENT — MONTREAL COGNITIVE ASSESSMENT (MOCA)
10. [FLUENCY] NAME WORDS STARTING WITH DESIGNATED LETTER: 1
4. NAME EACH OF THE THREE ANIMALS SHOWN: 3
12. MEMORY INDEX SCORE: 5
9. REPEAT EACH SENTENCE: 2
WHAT IS THE TOTAL SCORE (OUT OF 30): 29
VISUOSPATIAL/EXECUTIVE SUBSCORE: 5
13. ORIENTATION SUBSCORE: 6
5. MEMORY TRIALS: 0
6. READ LIST OF DIGITS [FORWARD/BACKWARD]: 2
8. SERIAL SUBTRACTION OF 7S: 3
WHAT LEVEL OF EDUCATION WAS ATTAINED: 0
11. FOR EACH PAIR OF WORDS, WHAT CATEGORY DO THEY BELONG TO (OUT OF 2): 2
7. [VIGILENCE] TAP WHEN HEARING DESIGNATED LETTER: 0

## 2024-12-18 ASSESSMENT — PAIN SCALES - GENERAL: PAINLEVEL_OUTOF10: 0-NO PAIN

## 2024-12-18 NOTE — PATIENT INSTRUCTIONS
It was a pleasure seeing you today.    Overall your cognitive testing was normal in the office. I would like to continue to monitor.    You can consider an online tool, called the [employer], to monitor your memory. The APT Webstudy monitors volunteers who are 50 and older for changes in their memory through a series of quarterly, no-cost memory tests. It takes place online, without any in-person visits required. [employer] researchers use the results to track volunteers’ memory and, based on potential risk level, invite select volunteers to participate in other Alzheimer’s research studies.    Visit https://www.aptwebstudy.org/ if you are interested.     Follow up in one year.    If you have any questions or concerns please call my office at [phone].

## 2025-01-30 ENCOUNTER — OFFICE VISIT (OUTPATIENT)
Dept: PRIMARY CARE | Facility: CLINIC | Age: 65
End: 2025-01-30
Payer: COMMERCIAL

## 2025-01-30 VITALS
BODY MASS INDEX: 25.49 KG/M2 | HEART RATE: 107 BPM | HEIGHT: 65 IN | DIASTOLIC BLOOD PRESSURE: 88 MMHG | TEMPERATURE: 98.6 F | SYSTOLIC BLOOD PRESSURE: 122 MMHG | OXYGEN SATURATION: 94 % | WEIGHT: 153 LBS

## 2025-01-30 DIAGNOSIS — J10.1 INFLUENZA A: Primary | ICD-10-CM

## 2025-01-30 PROBLEM — R07.89 ATYPICAL CHEST PAIN: Status: RESOLVED | Noted: 2023-03-28 | Resolved: 2025-01-30

## 2025-01-30 PROBLEM — R06.02 SHORTNESS OF BREATH: Status: RESOLVED | Noted: 2023-03-28 | Resolved: 2025-01-30

## 2025-01-30 LAB
POC RAPID INFLUENZA A: POSITIVE
POC RAPID INFLUENZA B: NEGATIVE

## 2025-01-30 PROCEDURE — 87804 INFLUENZA ASSAY W/OPTIC: CPT | Performed by: FAMILY MEDICINE

## 2025-01-30 PROCEDURE — 3008F BODY MASS INDEX DOCD: CPT | Performed by: FAMILY MEDICINE

## 2025-01-30 PROCEDURE — 1036F TOBACCO NON-USER: CPT | Performed by: FAMILY MEDICINE

## 2025-01-30 PROCEDURE — 99213 OFFICE O/P EST LOW 20 MIN: CPT | Performed by: FAMILY MEDICINE

## 2025-01-30 RX ORDER — OSELTAMIVIR PHOSPHATE 75 MG/1
75 CAPSULE ORAL 2 TIMES DAILY
Qty: 10 CAPSULE | Refills: 0 | Status: SHIPPED | OUTPATIENT
Start: 2025-01-30 | End: 2025-02-04

## 2025-01-30 ASSESSMENT — ENCOUNTER SYMPTOMS
FEVER: 1
COUGH: 1
SHORTNESS OF BREATH: 0

## 2025-01-30 ASSESSMENT — PATIENT HEALTH QUESTIONNAIRE - PHQ9
1. LITTLE INTEREST OR PLEASURE IN DOING THINGS: NOT AT ALL
2. FEELING DOWN, DEPRESSED OR HOPELESS: NOT AT ALL
SUM OF ALL RESPONSES TO PHQ9 QUESTIONS 1 AND 2: 0

## 2025-01-30 NOTE — PATIENT INSTRUCTIONS
Influenza is caused by a virus.  That usually causes symptoms such as fevers, muscle aches, cough.  You can take over-the-counter medications for symptom relief such as Tylenol, anti-inflammatories or cough and cold medications.  If the influenza is diagnosed within the first 72 hours of onset of symptoms we can use an antiviral.  The antiviral does not completely resolve the infection, it only diminishes  the intensity of the symptoms and length of time that you are  ill.  Even with an antiviral, viruses require your body to fight it off.   Influenza is very contagious and you're considered contagious up to 7 days after the onset of your symptoms.   You should avoid close contact with others, keep your mouth covered with coughing , Usually we recommend avoiding work or school until you are beyond that 7 day period.   Sometimes you can get secondary infections from influenza such as a secondary pneumonia or sinus infection.  If you find your symptoms suddenly worsen such as worsening fevers or you're not improving let your doctor know.    It is important that you stay well hydrated, drink regular fluids and get adequate rest.      You may take over-the-counter medications as needed for symptom relief.  Call the office if your symptoms worsen such as high fever and worsening cough or increase in symptoms.       Gen: Well appearing in NAD  Head: NC/AT  Neck: trachea midline  Resp:  No distress  Ext: l 4th digit w/ fusiform swelling and diffuse erythema,   Neuro:  A&O appears non focal  Skin:  Warm and dry as visualized  Psych:  Normal affect and mood

## 2025-01-30 NOTE — PROGRESS NOTES
"Subjective   Patient ID: Eduarda Foss is a 65 y.o. female who presents for Flu Symptoms. Sx's onset this Tuesday; home covid testing negative. Fever, chills, body aches and a cough; right portion of her waist is sore from coughing.     Cough congestion   Temp 101.3          Review of Systems   Constitutional:  Positive for fever.   HENT:  Positive for congestion.    Respiratory:  Positive for cough. Negative for shortness of breath.        Objective   /88   Pulse 107   Temp 37 °C (98.6 °F)   Ht 1.651 m (5' 5\")   Wt 69.4 kg (153 lb)   SpO2 94%   BMI 25.46 kg/m²     Physical Exam  Constitutional:       Appearance: Normal appearance.   HENT:      Head: Normocephalic and atraumatic.      Right Ear: Tympanic membrane and ear canal normal.      Left Ear: Tympanic membrane and ear canal normal.      Nose: No nasal deformity.      Right Sinus: No maxillary sinus tenderness or frontal sinus tenderness.      Left Sinus: No maxillary sinus tenderness or frontal sinus tenderness.      Mouth/Throat:      Mouth: Mucous membranes are moist. No oral lesions.      Tongue: No lesions.      Pharynx: Oropharynx is clear. Posterior oropharyngeal erythema present.   Cardiovascular:      Rate and Rhythm: Normal rate and regular rhythm.   Pulmonary:      Effort: Pulmonary effort is normal.      Breath sounds: Normal breath sounds.      Comments: Coughing  Musculoskeletal:      Cervical back: No tenderness.   Lymphadenopathy:      Cervical: No cervical adenopathy.   Neurological:      Mental Status: She is alert.   Psychiatric:         Mood and Affect: Mood normal.         Assessment/Plan   Problem List Items Addressed This Visit    None  Visit Diagnoses         Codes    Influenza A    -  Primary J10.1    Relevant Medications    oseltamivir (Tamiflu) 75 mg capsule    Other Relevant Orders    POCT Influenza A/B manually resulted (Completed)               "

## 2025-02-25 ENCOUNTER — APPOINTMENT (OUTPATIENT)
Dept: PRIMARY CARE | Facility: CLINIC | Age: 65
End: 2025-02-25
Payer: COMMERCIAL

## 2025-02-25 VITALS
HEART RATE: 79 BPM | HEIGHT: 65 IN | OXYGEN SATURATION: 94 % | SYSTOLIC BLOOD PRESSURE: 132 MMHG | WEIGHT: 154 LBS | DIASTOLIC BLOOD PRESSURE: 87 MMHG | BODY MASS INDEX: 25.66 KG/M2

## 2025-02-25 DIAGNOSIS — R73.09 BLOOD GLUCOSE ABNORMAL: ICD-10-CM

## 2025-02-25 DIAGNOSIS — R93.1 AGATSTON CORONARY ARTERY CALCIUM SCORE LESS THAN 100: ICD-10-CM

## 2025-02-25 DIAGNOSIS — Z12.12 ENCOUNTER FOR COLORECTAL CANCER SCREENING: ICD-10-CM

## 2025-02-25 DIAGNOSIS — E55.9 VITAMIN D DEFICIENCY: ICD-10-CM

## 2025-02-25 DIAGNOSIS — K21.9 CHRONIC GERD: Primary | ICD-10-CM

## 2025-02-25 DIAGNOSIS — Z12.11 ENCOUNTER FOR COLORECTAL CANCER SCREENING: ICD-10-CM

## 2025-02-25 DIAGNOSIS — E78.5 HYPERLIPIDEMIA, UNSPECIFIED HYPERLIPIDEMIA TYPE: ICD-10-CM

## 2025-02-25 DIAGNOSIS — K29.50 CHRONIC GASTRITIS, PRESENCE OF BLEEDING UNSPECIFIED, UNSPECIFIED GASTRITIS TYPE: ICD-10-CM

## 2025-02-25 DIAGNOSIS — Z00.00 ROUTINE ADULT HEALTH MAINTENANCE: ICD-10-CM

## 2025-02-25 DIAGNOSIS — E06.3 HYPOTHYROIDISM DUE TO HASHIMOTO'S THYROIDITIS: ICD-10-CM

## 2025-02-25 PROBLEM — M70.60 GREATER TROCHANTERIC BURSITIS: Status: RESOLVED | Noted: 2024-10-08 | Resolved: 2025-02-25

## 2025-02-25 PROBLEM — E04.1 THYROID NODULE: Status: RESOLVED | Noted: 2023-03-23 | Resolved: 2025-02-25

## 2025-02-25 PROBLEM — M54.16 RIGHT LUMBAR RADICULOPATHY: Status: RESOLVED | Noted: 2023-03-23 | Resolved: 2025-02-25

## 2025-02-25 PROBLEM — R10.84 GENERALIZED ABDOMINAL DISCOMFORT: Status: RESOLVED | Noted: 2024-10-10 | Resolved: 2025-02-25

## 2025-02-25 PROBLEM — R53.83 OTHER FATIGUE: Status: RESOLVED | Noted: 2023-03-28 | Resolved: 2025-02-25

## 2025-02-25 PROBLEM — G47.09 OTHER INSOMNIA: Status: RESOLVED | Noted: 2023-03-28 | Resolved: 2025-02-25

## 2025-02-25 PROBLEM — M54.2 NECK PAIN ON LEFT SIDE: Status: RESOLVED | Noted: 2024-11-19 | Resolved: 2025-02-25

## 2025-02-25 PROCEDURE — 99214 OFFICE O/P EST MOD 30 MIN: CPT | Performed by: NURSE PRACTITIONER

## 2025-02-25 PROCEDURE — 3008F BODY MASS INDEX DOCD: CPT | Performed by: NURSE PRACTITIONER

## 2025-02-25 PROCEDURE — 1036F TOBACCO NON-USER: CPT | Performed by: NURSE PRACTITIONER

## 2025-02-25 ASSESSMENT — ENCOUNTER SYMPTOMS
SLEEP DISTURBANCE: 1
FATIGUE: 0
BACK PAIN: 0
HEADACHES: 1
ABDOMINAL DISTENTION: 0
EYE PAIN: 0
COUGH: 0
FEVER: 0
PALPITATIONS: 0
JOINT SWELLING: 0
SEIZURES: 0
CONSTIPATION: 1
WOUND: 0
TROUBLE SWALLOWING: 0
COLOR CHANGE: 0
DIZZINESS: 0
MYALGIAS: 0
NECK PAIN: 0
DIFFICULTY URINATING: 0
WHEEZING: 0
ABDOMINAL PAIN: 0
DIARRHEA: 0
SHORTNESS OF BREATH: 0
ADENOPATHY: 0
BRUISES/BLEEDS EASILY: 0
NAUSEA: 0
CHILLS: 0
WEAKNESS: 0

## 2025-02-25 NOTE — ASSESSMENT & PLAN NOTE
"Recent cardiac calcium scoring completed in 2023 and showed \"1. Coronary artery calcium score of 95.87.*. 2. Mild interval increase in atherosclerotic calcification of the coronary arteries with slight interval increase in ascending aortic caliber when compared to a study of 02/18. Correlate with a component of systemic hypertension. Recommend a continued surveillance CT scan in 5 years to monitor calcium score and aortic size\".  Will plan on repeat testing in 2028 for surveillance purposes  "

## 2025-02-25 NOTE — ASSESSMENT & PLAN NOTE
Encouraged patient to take her omeprazole daily for symptom control in addition to avoiding trigger foods such as alcohol and fried/fatty meals.  Orders placed for repeat EGD and colonoscopy for surveillance processes given chronic gastritis findings

## 2025-02-25 NOTE — ASSESSMENT & PLAN NOTE
Routine blood work ordered today for assessment purposes.  Will continue to monitor as appropriate  -Patient reports undergoing mammogram in 2024 at an outside facility and that she will bring a copy into the office for review  -EGD/ Colonoscopy completed in 2021  -PAP/Pelvic completed in 2022.  Will need to repeat in 2027 as patient states all previous pelvic exams have been benign

## 2025-02-25 NOTE — PROGRESS NOTES
"Subjective   Patient ID: Eduarda Foss is a 65 y.o. female who presents for Follow-up (Pt in for med review.  Pt having issues with stomach.  Pt did not do food diary.  ).    Patient seen today to discuss routine follow-up and medication management.  Patient tested positive for insulins a last month and states her symptoms have significantly improved.  No persistent issues with upper respiratory concerns and patient states that she is now back to eating and drinking normally.  Patient states that when she was acutely ill with influenza A, she was able alcohol and other heavier/fatty foods.  During this time, she denies any recurrent issues with acid reflux.  When she started drinking alcohol again and eating fatty foods, she admits to increased abdominal burning.  Encouraged patient to limit alcohol, dairy, fried fatty foods and other trigger items in addition to taking her PPI daily which she does not know is due.  Discussed patient's colonoscopy and EGD completed in 2021.  Colonoscopy was incomplete due to \"redundant colon \".  Patient agreeable for repeat testing for reassessment purposes. Patient denies any blood/mucus in stools, weight loss or other GI concerns.  Patient states that although the symptoms have persisted they have not worsened over time.  At previous visit, patient was instructed to keep a food diary but has not done so as of yet.  She does not appear to be interested in following up with gastroenterology just yet.  Patient denies any issues with appetite, staying hydrated or bladder.  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  She reports playing pickle ball with no associated shortness of breath or chest pain with exertion.  Patient is following with neurology due to concerns of cognitive changes. Patient also does report occasional migraine headaches which are relatively well-controlled with as needed triptan use.  Patient does not smoke cigarettes but does admit to " seemingly routine cannabis use.  Medications reviewed.  No other acute concerns voiced at this time.       Current Outpatient Medications on File Prior to Visit   Medication Sig Dispense Refill    azelastine (Astelin) 137 mcg (0.1 %) nasal spray Administer 2 sprays into each nostril 2 times a day. 90 mL 1    cholecalciferol (Vitamin D-3) 125 MCG (5000 UT) capsule Take by mouth.      fexofenadine (Allegra) 180 mg tablet Take 1 tablet (180 mg) by mouth once daily as needed.      levothyroxine (Synthroid, Levoxyl) 125 mcg tablet Take 1 tablet (125 mcg) by mouth once daily. 90 tablet 0    multivitamin tablet Take 1 tablet by mouth once daily.      omeprazole (PriLOSEC) 40 mg DR capsule Take 1 capsule (40 mg) by mouth once daily. 90 capsule 3    simvastatin (Zocor) 20 mg tablet Take 1 tablet (20 mg) by mouth once daily at bedtime. 90 tablet 0    SUMAtriptan (Imitrex) 100 mg tablet Take 1 tablet (100 mg) by mouth 1 time if needed for migraine for up to 27 doses. 9 tablet 0    zinc gluconate 100 mg tablet Take by mouth.       No current facility-administered medications on file prior to visit.       Past Medical History:   Diagnosis Date    Allergic     Disease of thyroid gland 1997    GERD (gastroesophageal reflux disease)     Migraine     Nasal congestion 04/06/2021        Past Surgical History:   Procedure Laterality Date    WISDOM TOOTH EXTRACTION  1986        Family History   Problem Relation Name Age of Onset    Heart disease Mother Cheyenne Hutchinson     Hypothyroidism Mother Cheyenne Hutchinson     Osteoporosis Mother Cheyenne Hutchinson     Arthritis Mother Cheyenne Hutchinson     Cancer Father Thang Hutchinson     Diabetes Father Thang Hutchinson     Hypothyroidism Brother      Allergies Other          Review of Systems   Constitutional:  Negative for chills, fatigue and fever.   HENT:  Negative for dental problem and trouble swallowing.    Eyes:  Negative for pain and visual disturbance.        Wears glasses   Respiratory:  Negative for cough,  "shortness of breath and wheezing.    Cardiovascular:  Negative for chest pain, palpitations and leg swelling.   Gastrointestinal:  Positive for constipation. Negative for abdominal distention, abdominal pain, diarrhea and nausea.        Positive for intermittent issues with increased gas/cramping.  Constipation controlled with MiraLAX   Endocrine: Negative for cold intolerance and heat intolerance.   Genitourinary:  Negative for difficulty urinating.   Musculoskeletal:  Negative for back pain, gait problem, joint swelling, myalgias and neck pain.   Skin:  Negative for color change, pallor, rash and wound.   Allergic/Immunologic: Positive for environmental allergies. Negative for food allergies.   Neurological:  Positive for headaches. Negative for dizziness, seizures and weakness.        Positive for occasional migraine headache   Hematological:  Negative for adenopathy. Does not bruise/bleed easily.   Psychiatric/Behavioral:  Positive for sleep disturbance. Negative for behavioral problems.    All other systems reviewed and are negative.      Objective   /87   Pulse 79   Ht 1.651 m (5' 5\")   Wt 69.9 kg (154 lb)   SpO2 94%   BMI 25.63 kg/m²     Physical Exam  Constitutional:       General: She is not in acute distress.     Appearance: Normal appearance. She is not toxic-appearing.   HENT:      Head: Normocephalic and atraumatic.      Right Ear: External ear normal.      Left Ear: External ear normal.      Nose: Nose normal.      Mouth/Throat:      Mouth: Mucous membranes are moist.      Pharynx: Oropharynx is clear.   Eyes:      Extraocular Movements: Extraocular movements intact.      Conjunctiva/sclera: Conjunctivae normal.   Neck:      Vascular: No carotid bruit.   Cardiovascular:      Rate and Rhythm: Normal rate and regular rhythm.      Pulses: Normal pulses.      Heart sounds: Normal heart sounds. No murmur heard.  Pulmonary:      Effort: Pulmonary effort is normal.      Breath sounds: Normal breath " sounds. No wheezing.   Abdominal:      General: Bowel sounds are normal.      Palpations: Abdomen is soft.   Musculoskeletal:         General: No swelling.      Cervical back: Normal range of motion and neck supple. No pain with movement. Normal range of motion.   Skin:     General: Skin is warm and dry.   Neurological:      General: No focal deficit present.      Mental Status: She is alert and oriented to person, place, and time. Mental status is at baseline.      Cranial Nerves: No cranial nerve deficit.      Motor: No weakness.   Psychiatric:         Mood and Affect: Mood normal.         Behavior: Behavior normal.         Thought Content: Thought content normal.         Judgment: Judgment normal.         Assessment/Plan   Problem List Items Addressed This Visit             ICD-10-CM    Chronic GERD - Primary K21.9     Encouraged patient to take her omeprazole daily for symptom control in addition to avoiding trigger foods such as alcohol and fried/fatty meals.  Orders placed for repeat EGD and colonoscopy for surveillance processes given chronic gastritis findings         Relevant Orders    Esophagogastroduodenoscopy (EGD)    Colonoscopy Screening; Average Risk Patient    Hyperlipidemia E78.5     Patient continues on simvastatin without issue.  Will continue to monitor lipid panel routinely         Hypothyroidism due to Hashimoto's thyroiditis E06.3     Lab Results   Component Value Date    TSH 0.57 10/10/2024      Patient to continue current levothyroxine dosing.  Will continue to monitor TSH levels routinely           Routine adult health maintenance Z00.00     Routine blood work ordered today for assessment purposes.  Will continue to monitor as appropriate  -Patient reports undergoing mammogram in 2024 at an outside facility and that she will bring a copy into the office for review  -EGD/ Colonoscopy completed in 2021  -PAP/Pelvic completed in 2022.  Will need to repeat in 2027 as patient states all previous  "pelvic exams have been benign         Relevant Orders    Hemoglobin A1C    Comprehensive Metabolic Panel    TSH with reflex to Free T4 if abnormal    Lipid Panel    Vitamin D 25-Hydroxy,Total (for eval of Vitamin D levels)    CBC and Auto Differential    Agatston coronary artery calcium score less than 100 R93.1     Recent cardiac calcium scoring completed in 2023 and showed \"1. Coronary artery calcium score of 95.87.*. 2. Mild interval increase in atherosclerotic calcification of the coronary arteries with slight interval increase in ascending aortic caliber when compared to a study of 02/18. Correlate with a component of systemic hypertension. Recommend a continued surveillance CT scan in 5 years to monitor calcium score and aortic size\".  Will plan on repeat testing in 2028 for surveillance purposes          Other Visit Diagnoses         Codes    Chronic gastritis, presence of bleeding unspecified, unspecified gastritis type     K29.50    Relevant Orders    Esophagogastroduodenoscopy (EGD)    Colonoscopy Screening; Average Risk Patient    Comprehensive Metabolic Panel    CBC and Auto Differential    Encounter for colorectal cancer screening     Z12.11, Z12.12    Relevant Orders    Colonoscopy Screening; Average Risk Patient    Blood glucose abnormal     R73.09    Relevant Orders    Hemoglobin A1C    Vitamin D deficiency     E55.9    Relevant Orders    Vitamin D 25-Hydroxy,Total (for eval of Vitamin D levels)                 "

## 2025-02-25 NOTE — PATIENT INSTRUCTIONS
Orders are in place for you to have fasting blood work completed in May. Please do not eat or drink 8 hours prior to having your blood drawn.   You may have your blood work completed in this building through [employer] ([address], [place] [address]).  For this location, you may schedule an appointment online or drop-in for walk-in services. [email or web address]  Hours:   Monday - Friday: 7:30 a.m. - 5 p.m. and Saturday: 8 a.m. - 11 a.m.  Phone:  [phone]       Please arrange for routine follow up in 6 months. You may schedule on-line through [email or web address] or call our office at [phone].

## 2025-03-25 DIAGNOSIS — Z12.11 SPECIAL SCREENING FOR MALIGNANT NEOPLASMS, COLON: ICD-10-CM

## 2025-03-25 RX ORDER — POLYETHYLENE GLYCOL 3350, SODIUM SULFATE ANHYDROUS, SODIUM BICARBONATE, SODIUM CHLORIDE, POTASSIUM CHLORIDE 236; 22.74; 6.74; 5.86; 2.97 G/4L; G/4L; G/4L; G/4L; G/4L
4 POWDER, FOR SOLUTION ORAL ONCE
Qty: 4000 ML | Refills: 0 | Status: SHIPPED | OUTPATIENT
Start: 2025-03-25 | End: 2025-03-25

## 2025-04-03 ENCOUNTER — APPOINTMENT (OUTPATIENT)
Dept: GASTROENTEROLOGY | Facility: EXTERNAL LOCATION | Age: 65
End: 2025-04-03
Payer: COMMERCIAL

## 2025-04-03 DIAGNOSIS — K21.9 CHRONIC GERD: Primary | ICD-10-CM

## 2025-04-03 DIAGNOSIS — Z12.11 ENCOUNTER FOR COLORECTAL CANCER SCREENING: ICD-10-CM

## 2025-04-03 DIAGNOSIS — K29.50 CHRONIC GASTRITIS, PRESENCE OF BLEEDING UNSPECIFIED, UNSPECIFIED GASTRITIS TYPE: ICD-10-CM

## 2025-04-03 DIAGNOSIS — D12.4 BENIGN NEOPLASM OF DESCENDING COLON: ICD-10-CM

## 2025-04-03 DIAGNOSIS — Z12.12 ENCOUNTER FOR COLORECTAL CANCER SCREENING: ICD-10-CM

## 2025-04-03 DIAGNOSIS — K29.70 GASTRITIS WITHOUT BLEEDING, UNSPECIFIED CHRONICITY, UNSPECIFIED GASTRITIS TYPE: ICD-10-CM

## 2025-04-03 DIAGNOSIS — K31.4 GASTRIC DIVERTICULUM: ICD-10-CM

## 2025-04-03 DIAGNOSIS — K44.9 HIATAL HERNIA: ICD-10-CM

## 2025-04-03 DIAGNOSIS — K21.9 CHRONIC GERD: ICD-10-CM

## 2025-04-03 PROCEDURE — 45380 COLONOSCOPY AND BIOPSY: CPT | Performed by: INTERNAL MEDICINE

## 2025-04-03 PROCEDURE — 0753T DGTZ GLS MCRSCP SLD LEVEL IV: CPT

## 2025-04-03 PROCEDURE — 88305 TISSUE EXAM BY PATHOLOGIST: CPT

## 2025-04-03 PROCEDURE — 88305 TISSUE EXAM BY PATHOLOGIST: CPT | Performed by: PATHOLOGY

## 2025-04-03 PROCEDURE — 43239 EGD BIOPSY SINGLE/MULTIPLE: CPT | Performed by: INTERNAL MEDICINE

## 2025-04-04 ENCOUNTER — LAB REQUISITION (OUTPATIENT)
Dept: LAB | Facility: HOSPITAL | Age: 65
End: 2025-04-04
Payer: COMMERCIAL

## 2025-04-04 DIAGNOSIS — Z12.11 ENCOUNTER FOR SCREENING FOR MALIGNANT NEOPLASM OF COLON: ICD-10-CM

## 2025-04-14 LAB
LABORATORY COMMENT REPORT: NORMAL
PATH REPORT.FINAL DX SPEC: NORMAL
PATH REPORT.GROSS SPEC: NORMAL
PATH REPORT.RELEVANT HX SPEC: NORMAL
PATH REPORT.TOTAL CANCER: NORMAL

## 2025-04-24 DIAGNOSIS — E06.3 HYPOTHYROIDISM DUE TO HASHIMOTO'S THYROIDITIS: ICD-10-CM

## 2025-04-24 DIAGNOSIS — E78.5 HYPERLIPIDEMIA, UNSPECIFIED HYPERLIPIDEMIA TYPE: ICD-10-CM

## 2025-04-24 RX ORDER — LEVOTHYROXINE SODIUM 125 UG/1
125 TABLET ORAL DAILY
Qty: 90 TABLET | Refills: 1 | Status: SHIPPED | OUTPATIENT
Start: 2025-04-24

## 2025-04-24 RX ORDER — SIMVASTATIN 20 MG/1
20 TABLET, FILM COATED ORAL NIGHTLY
Qty: 90 TABLET | Refills: 1 | Status: SHIPPED | OUTPATIENT
Start: 2025-04-24

## 2025-05-29 DIAGNOSIS — Z12.31 SCREENING MAMMOGRAM FOR BREAST CANCER: Primary | ICD-10-CM

## 2025-06-10 ENCOUNTER — HOSPITAL ENCOUNTER (OUTPATIENT)
Dept: RADIOLOGY | Facility: HOSPITAL | Age: 65
Discharge: HOME | End: 2025-06-10
Payer: COMMERCIAL

## 2025-06-10 ENCOUNTER — HOSPITAL ENCOUNTER (OUTPATIENT)
Dept: RADIOLOGY | Facility: EXTERNAL LOCATION | Age: 65
Discharge: HOME | End: 2025-06-10

## 2025-06-10 VITALS — WEIGHT: 149 LBS | HEIGHT: 65 IN | BODY MASS INDEX: 24.83 KG/M2

## 2025-06-10 DIAGNOSIS — Z12.31 SCREENING MAMMOGRAM FOR BREAST CANCER: ICD-10-CM

## 2025-06-10 PROCEDURE — 77067 SCR MAMMO BI INCL CAD: CPT | Performed by: RADIOLOGY

## 2025-06-10 PROCEDURE — 77063 BREAST TOMOSYNTHESIS BI: CPT | Performed by: RADIOLOGY

## 2025-06-10 PROCEDURE — 77067 SCR MAMMO BI INCL CAD: CPT

## 2025-06-11 LAB
25(OH)D3+25(OH)D2 SERPL-MCNC: 58 NG/ML (ref 30–100)
ALBUMIN SERPL-MCNC: 4.5 G/DL (ref 3.6–5.1)
ALP SERPL-CCNC: 63 U/L (ref 37–153)
ALT SERPL-CCNC: 16 U/L (ref 6–29)
ANION GAP SERPL CALCULATED.4IONS-SCNC: 9 MMOL/L (CALC) (ref 7–17)
AST SERPL-CCNC: 14 U/L (ref 10–35)
BASOPHILS # BLD AUTO: 53 CELLS/UL (ref 0–200)
BASOPHILS NFR BLD AUTO: 0.5 %
BILIRUB SERPL-MCNC: 0.4 MG/DL (ref 0.2–1.2)
BUN SERPL-MCNC: 12 MG/DL (ref 7–25)
CALCIUM SERPL-MCNC: 9.8 MG/DL (ref 8.6–10.4)
CHLORIDE SERPL-SCNC: 103 MMOL/L (ref 98–110)
CHOLEST SERPL-MCNC: 155 MG/DL
CHOLEST/HDLC SERPL: 2.7 (CALC)
CO2 SERPL-SCNC: 28 MMOL/L (ref 20–32)
CREAT SERPL-MCNC: 0.63 MG/DL (ref 0.5–1.05)
EGFRCR SERPLBLD CKD-EPI 2021: 99 ML/MIN/1.73M2
EOSINOPHIL # BLD AUTO: 201 CELLS/UL (ref 15–500)
EOSINOPHIL NFR BLD AUTO: 1.9 %
ERYTHROCYTE [DISTWIDTH] IN BLOOD BY AUTOMATED COUNT: 13 % (ref 11–15)
EST. AVERAGE GLUCOSE BLD GHB EST-MCNC: 126 MG/DL
EST. AVERAGE GLUCOSE BLD GHB EST-SCNC: 7 MMOL/L
GLUCOSE SERPL-MCNC: 95 MG/DL (ref 65–99)
HBA1C MFR BLD: 6 %
HCT VFR BLD AUTO: 43.7 % (ref 35–45)
HDLC SERPL-MCNC: 57 MG/DL
HGB BLD-MCNC: 13.8 G/DL (ref 11.7–15.5)
LDLC SERPL CALC-MCNC: 79 MG/DL (CALC)
LYMPHOCYTES # BLD AUTO: 3042 CELLS/UL (ref 850–3900)
LYMPHOCYTES NFR BLD AUTO: 28.7 %
MCH RBC QN AUTO: 30.5 PG (ref 27–33)
MCHC RBC AUTO-ENTMCNC: 31.6 G/DL (ref 32–36)
MCV RBC AUTO: 96.5 FL (ref 80–100)
MONOCYTES # BLD AUTO: 636 CELLS/UL (ref 200–950)
MONOCYTES NFR BLD AUTO: 6 %
NEUTROPHILS # BLD AUTO: 6667 CELLS/UL (ref 1500–7800)
NEUTROPHILS NFR BLD AUTO: 62.9 %
NONHDLC SERPL-MCNC: 98 MG/DL (CALC)
PLATELET # BLD AUTO: 276 THOUSAND/UL (ref 140–400)
PMV BLD REES-ECKER: 10.7 FL (ref 7.5–12.5)
POTASSIUM SERPL-SCNC: 4.8 MMOL/L (ref 3.5–5.3)
PROT SERPL-MCNC: 7.4 G/DL (ref 6.1–8.1)
RBC # BLD AUTO: 4.53 MILLION/UL (ref 3.8–5.1)
SODIUM SERPL-SCNC: 140 MMOL/L (ref 135–146)
TRIGL SERPL-MCNC: 102 MG/DL
TSH SERPL-ACNC: 0.63 MIU/L (ref 0.4–4.5)
WBC # BLD AUTO: 10.6 THOUSAND/UL (ref 3.8–10.8)

## 2025-07-15 ENCOUNTER — OFFICE VISIT (OUTPATIENT)
Dept: PRIMARY CARE | Facility: CLINIC | Age: 65
End: 2025-07-15
Payer: COMMERCIAL

## 2025-07-15 VITALS
SYSTOLIC BLOOD PRESSURE: 124 MMHG | DIASTOLIC BLOOD PRESSURE: 90 MMHG | OXYGEN SATURATION: 97 % | HEIGHT: 65 IN | WEIGHT: 153 LBS | BODY MASS INDEX: 25.49 KG/M2 | HEART RATE: 92 BPM

## 2025-07-15 DIAGNOSIS — R05.3 PERSISTENT COUGH: ICD-10-CM

## 2025-07-15 DIAGNOSIS — J30.2 SEASONAL ALLERGIC RHINITIS, UNSPECIFIED TRIGGER: Primary | ICD-10-CM

## 2025-07-15 PROCEDURE — 3008F BODY MASS INDEX DOCD: CPT | Performed by: NURSE PRACTITIONER

## 2025-07-15 PROCEDURE — 1036F TOBACCO NON-USER: CPT | Performed by: NURSE PRACTITIONER

## 2025-07-15 PROCEDURE — 99213 OFFICE O/P EST LOW 20 MIN: CPT | Performed by: NURSE PRACTITIONER

## 2025-07-15 RX ORDER — GUAIFENESIN 600 MG/1
1200 TABLET, EXTENDED RELEASE ORAL 2 TIMES DAILY
Qty: 56 TABLET | Refills: 0 | Status: SHIPPED | OUTPATIENT
Start: 2025-07-15 | End: 2025-07-29

## 2025-07-15 RX ORDER — FLUTICASONE PROPIONATE 50 MCG
1 SPRAY, SUSPENSION (ML) NASAL DAILY
Qty: 16 G | Refills: 0 | Status: SHIPPED | OUTPATIENT
Start: 2025-07-15 | End: 2026-07-15

## 2025-07-15 ASSESSMENT — ENCOUNTER SYMPTOMS
WEAKNESS: 0
PALPITATIONS: 0
COUGH: 1
DIARRHEA: 0
BACK PAIN: 0
DIZZINESS: 0
WHEEZING: 0
MYALGIAS: 0
CONSTIPATION: 1
NAUSEA: 0
ABDOMINAL PAIN: 0
FATIGUE: 0
ADENOPATHY: 0
HEADACHES: 1
SLEEP DISTURBANCE: 1
DIFFICULTY URINATING: 0
TROUBLE SWALLOWING: 0
CHILLS: 0
JOINT SWELLING: 0
EYE PAIN: 0
SHORTNESS OF BREATH: 0
ABDOMINAL DISTENTION: 0
COLOR CHANGE: 0
BRUISES/BLEEDS EASILY: 0
FEVER: 0
NECK PAIN: 0
SEIZURES: 0
WOUND: 0

## 2025-07-15 NOTE — PROGRESS NOTES
Subjective   Patient ID: Eduarda Foss is a 65 y.o. female who presents for Sinusitis (I had an upper respiratory infection and sinus infection in June, I had an online visit and was prescribed 4 medications. I did improve, but still do not feel myself (very tired)and wondered if there might be something else going on. So, I am making an appointment to come in.).    Patient seen today due to persistent concerns with cough/congestion after being treated for an upper respiratory infection in June.  Patient reports that she went to the [facility] in [place] and was treated for an infection in June with a Medrol Dosepak, a Z-Jesse and albuterol inhaler.  She reports some improvement of her overall symptoms but a cough with increased mucus persists.  Minimal shortness of breath and occasional positional wheezing reported.  Patient denies any fever, chills or other systemic concerns.  No reported issues with nausea, vomiting or diarrhea.  Patient does have reported allergies for which she takes generic of Allegra routinely.  She states that she was also previously prescribed Flonase but has run out of it. Patient does not smoke cigarettes but does admit to seemingly routine cannabis use.  Medications reviewed.  No other acute concerns voiced at this time.         Current Outpatient Medications on File Prior to Visit   Medication Sig Dispense Refill    azelastine (Astelin) 137 mcg (0.1 %) nasal spray Administer 2 sprays into each nostril 2 times a day. 90 mL 1    cholecalciferol (Vitamin D-3) 125 MCG (5000 UT) capsule Take by mouth.      fexofenadine (Allegra) 180 mg tablet Take 1 tablet (180 mg) by mouth once daily as needed.      levothyroxine (Synthroid, Levoxyl) 125 mcg tablet Take 1 tablet (125 mcg) by mouth once daily. 90 tablet 1    multivitamin tablet Take 1 tablet by mouth once daily.      omeprazole (PriLOSEC) 40 mg DR capsule Take 1 capsule (40 mg) by mouth once daily. 90 capsule 3    simvastatin (Zocor)  20 mg tablet Take 1 tablet (20 mg) by mouth once daily at bedtime. 90 tablet 1    SUMAtriptan (Imitrex) 100 mg tablet Take 1 tablet (100 mg) by mouth 1 time if needed for migraine for up to 27 doses. 9 tablet 0    zinc gluconate 100 mg tablet Take by mouth.       No current facility-administered medications on file prior to visit.       Past Medical History:   Diagnosis Date    Allergic     Disease of thyroid gland 1997    GERD (gastroesophageal reflux disease)     Migraine     Nasal congestion 04/06/2021        Past Surgical History:   Procedure Laterality Date    WISDOM TOOTH EXTRACTION  1986        Family History   Problem Relation Name Age of Onset    Heart disease Mother Cheyenne Hutchinson     Hypothyroidism Mother Cheyenne Hutchinson     Osteoporosis Mother Cheyenne Hutchinson     Arthritis Mother Cheyenne Hutchinson     Cancer Father Thang Hutchinson     Diabetes Father Thang Hutchinson     Hypothyroidism Brother      Allergies Other          Review of Systems   Constitutional:  Negative for chills, fatigue and fever.   HENT:  Positive for congestion and postnasal drip. Negative for dental problem and trouble swallowing.    Eyes:  Negative for pain and visual disturbance.        Wears glasses   Respiratory:  Positive for cough. Negative for shortness of breath and wheezing.    Cardiovascular:  Negative for chest pain, palpitations and leg swelling.   Gastrointestinal:  Positive for constipation. Negative for abdominal distention, abdominal pain, diarrhea and nausea.        Positive for intermittent issues with increased gas/cramping.  Constipation controlled with MiraLAX   Endocrine: Negative for cold intolerance and heat intolerance.   Genitourinary:  Negative for difficulty urinating.   Musculoskeletal:  Negative for back pain, gait problem, joint swelling, myalgias and neck pain.   Skin:  Negative for color change, pallor, rash and wound.   Allergic/Immunologic: Positive for environmental allergies. Negative for food allergies.  "  Neurological:  Positive for headaches. Negative for dizziness, seizures and weakness.        Positive for occasional migraine headache   Hematological:  Negative for adenopathy. Does not bruise/bleed easily.   Psychiatric/Behavioral:  Positive for sleep disturbance. Negative for behavioral problems.    All other systems reviewed and are negative.      Objective   /90   Pulse 92   Ht 1.651 m (5' 5\")   Wt 69.4 kg (153 lb)   SpO2 97%   BMI 25.46 kg/m²     Physical Exam  Constitutional:       General: She is not in acute distress.     Appearance: Normal appearance. She is not toxic-appearing.   HENT:      Head: Normocephalic and atraumatic.      Right Ear: Ear canal and external ear normal. A middle ear effusion is present.      Left Ear: Ear canal and external ear normal. A middle ear effusion is present.      Ears:      Comments: Minimal bilateral ear effusion     Nose: Nose normal.      Mouth/Throat:      Mouth: Mucous membranes are moist.      Pharynx: Oropharynx is clear.   Eyes:      Extraocular Movements: Extraocular movements intact.      Conjunctiva/sclera: Conjunctivae normal.   Neck:      Vascular: No carotid bruit.   Cardiovascular:      Rate and Rhythm: Normal rate and regular rhythm.      Pulses: Normal pulses.      Heart sounds: Normal heart sounds. No murmur heard.  Pulmonary:      Effort: Pulmonary effort is normal.      Breath sounds: Normal breath sounds. No wheezing.   Abdominal:      General: Bowel sounds are normal.      Palpations: Abdomen is soft.   Musculoskeletal:         General: No swelling.      Cervical back: Normal range of motion and neck supple. No pain with movement. Normal range of motion.   Skin:     General: Skin is warm and dry.   Neurological:      General: No focal deficit present.      Mental Status: She is alert and oriented to person, place, and time. Mental status is at baseline.      Cranial Nerves: No cranial nerve deficit.      Motor: No weakness. "   Psychiatric:         Mood and Affect: Mood normal.         Behavior: Behavior normal.         Thought Content: Thought content normal.         Judgment: Judgment normal.         Assessment/Plan   Problem List Items Addressed This Visit           ICD-10-CM    Seasonal allergic rhinitis - Primary J30.2     Other Visit Diagnoses         Codes      Persistent cough     R05.3    Relevant Medications    fluticasone (Flonase) 50 mcg/actuation nasal spray    guaiFENesin (Mucinex) 600 mg 12 hr tablet          Do not suspect any persistent underlying infectious pathology at this time. Patient encouraged to continue with routine Allegra use as in addition to using Mucinex and Flonase. Patient encouraged to rest, stay hydrated and notify the provider for any persistent/ worsening cough/ congestion concerns.

## 2025-08-14 ENCOUNTER — APPOINTMENT (OUTPATIENT)
Dept: OTOLARYNGOLOGY | Facility: CLINIC | Age: 65
End: 2025-08-14
Payer: COMMERCIAL

## 2025-08-14 VITALS — WEIGHT: 150 LBS | BODY MASS INDEX: 24.96 KG/M2

## 2025-08-14 DIAGNOSIS — K21.9 CHRONIC GERD: ICD-10-CM

## 2025-08-14 DIAGNOSIS — J34.2 DEVIATED SEPTUM: ICD-10-CM

## 2025-08-14 DIAGNOSIS — J32.9 CHRONIC SINUSITIS, UNSPECIFIED LOCATION: Primary | ICD-10-CM

## 2025-08-14 DIAGNOSIS — J31.0 CHRONIC RHINITIS: ICD-10-CM

## 2025-08-14 PROCEDURE — 99214 OFFICE O/P EST MOD 30 MIN: CPT | Performed by: GENERAL PRACTICE

## 2025-08-14 RX ORDER — AMOXICILLIN AND CLAVULANATE POTASSIUM 875; 125 MG/1; MG/1
1 TABLET, FILM COATED ORAL 2 TIMES DAILY
Qty: 14 TABLET | Refills: 0 | Status: SHIPPED | OUTPATIENT
Start: 2025-08-14 | End: 2025-08-21

## 2025-08-14 RX ORDER — TRIAMCINOLONE ACETONIDE 55 UG/1
2 SPRAY, METERED NASAL DAILY
Qty: 16.5 G | Refills: 3 | Status: SHIPPED | OUTPATIENT
Start: 2025-08-14 | End: 2026-08-14

## 2025-08-14 RX ORDER — OMEPRAZOLE 20 MG/1
20 CAPSULE, DELAYED RELEASE ORAL
Qty: 30 CAPSULE | Refills: 3 | Status: SHIPPED | OUTPATIENT
Start: 2025-08-14 | End: 2026-08-14

## 2025-08-14 RX ORDER — FAMOTIDINE 20 MG/1
20 TABLET, FILM COATED ORAL NIGHTLY
Qty: 30 TABLET | Refills: 3 | Status: SHIPPED | OUTPATIENT
Start: 2025-08-14 | End: 2026-08-14

## 2025-08-26 ENCOUNTER — APPOINTMENT (OUTPATIENT)
Dept: PRIMARY CARE | Facility: CLINIC | Age: 65
End: 2025-08-26
Payer: COMMERCIAL

## 2025-08-26 VITALS
WEIGHT: 151.2 LBS | OXYGEN SATURATION: 98 % | BODY MASS INDEX: 25.19 KG/M2 | DIASTOLIC BLOOD PRESSURE: 78 MMHG | HEIGHT: 65 IN | HEART RATE: 63 BPM | SYSTOLIC BLOOD PRESSURE: 128 MMHG

## 2025-08-26 DIAGNOSIS — J30.2 SEASONAL ALLERGIC RHINITIS, UNSPECIFIED TRIGGER: ICD-10-CM

## 2025-08-26 DIAGNOSIS — Z00.00 ROUTINE ADULT HEALTH MAINTENANCE: Primary | ICD-10-CM

## 2025-08-26 DIAGNOSIS — T78.40XD ALLERGIC DISORDER, SUBSEQUENT ENCOUNTER: ICD-10-CM

## 2025-08-26 DIAGNOSIS — J37.0 CHRONIC LARYNGITIS: ICD-10-CM

## 2025-08-26 DIAGNOSIS — E06.3 HYPOTHYROIDISM DUE TO HASHIMOTO'S THYROIDITIS: ICD-10-CM

## 2025-08-26 DIAGNOSIS — E78.5 HYPERLIPIDEMIA, UNSPECIFIED HYPERLIPIDEMIA TYPE: ICD-10-CM

## 2025-08-26 DIAGNOSIS — K21.9 CHRONIC GERD: ICD-10-CM

## 2025-08-26 DIAGNOSIS — G43.009 MIGRAINE WITHOUT AURA AND WITHOUT STATUS MIGRAINOSUS, NOT INTRACTABLE: ICD-10-CM

## 2025-08-26 PROCEDURE — 3008F BODY MASS INDEX DOCD: CPT | Performed by: NURSE PRACTITIONER

## 2025-08-26 PROCEDURE — 99214 OFFICE O/P EST MOD 30 MIN: CPT | Performed by: NURSE PRACTITIONER

## 2025-08-26 RX ORDER — SUMATRIPTAN SUCCINATE 100 MG/1
100 TABLET ORAL ONCE AS NEEDED
Qty: 9 TABLET | Refills: 1 | Status: SHIPPED | OUTPATIENT
Start: 2025-08-26

## 2025-08-26 RX ORDER — LEVOTHYROXINE SODIUM 125 UG/1
125 TABLET ORAL DAILY
Qty: 90 TABLET | Refills: 1 | Status: SHIPPED | OUTPATIENT
Start: 2025-08-26

## 2025-08-26 RX ORDER — SIMVASTATIN 20 MG/1
20 TABLET, FILM COATED ORAL NIGHTLY
Qty: 90 TABLET | Refills: 1 | Status: SHIPPED | OUTPATIENT
Start: 2025-08-26

## 2025-08-26 ASSESSMENT — ENCOUNTER SYMPTOMS
NECK PAIN: 0
COUGH: 1
SEIZURES: 0
ABDOMINAL DISTENTION: 0
FEVER: 0
DIZZINESS: 0
NAUSEA: 0
CONSTIPATION: 1
TROUBLE SWALLOWING: 0
MYALGIAS: 0
ADENOPATHY: 0
FATIGUE: 0
ABDOMINAL PAIN: 0
HEADACHES: 1
COLOR CHANGE: 0
DIFFICULTY URINATING: 0
EYE PAIN: 0
WOUND: 0
JOINT SWELLING: 0
PALPITATIONS: 0
BRUISES/BLEEDS EASILY: 0
CHILLS: 0
SLEEP DISTURBANCE: 1
DIARRHEA: 0
WHEEZING: 0
WEAKNESS: 0
SHORTNESS OF BREATH: 0
BACK PAIN: 0

## 2025-08-26 ASSESSMENT — PATIENT HEALTH QUESTIONNAIRE - PHQ9
1. LITTLE INTEREST OR PLEASURE IN DOING THINGS: NOT AT ALL
SUM OF ALL RESPONSES TO PHQ9 QUESTIONS 1 AND 2: 0
2. FEELING DOWN, DEPRESSED OR HOPELESS: NOT AT ALL

## 2025-08-26 ASSESSMENT — COLUMBIA-SUICIDE SEVERITY RATING SCALE - C-SSRS
6. HAVE YOU EVER DONE ANYTHING, STARTED TO DO ANYTHING, OR PREPARED TO DO ANYTHING TO END YOUR LIFE?: NO
1. IN THE PAST MONTH, HAVE YOU WISHED YOU WERE DEAD OR WISHED YOU COULD GO TO SLEEP AND NOT WAKE UP?: NO
2. HAVE YOU ACTUALLY HAD ANY THOUGHTS OF KILLING YOURSELF?: NO

## 2025-08-27 LAB
A ALTERNATA IGE QN: <0.1 KU/L
A ALTERNATA IGE RAST: 0
A FUMIGATUS IGE QN: <0.1 KU/L
A FUMIGATUS IGE RAST: 0
BERMUDA GRASS IGE QN: <0.1 KU/L
BERMUDA GRASS IGE RAST: 0
BOXELDER IGE QN: <0.1 KU/L
BOXELDER IGE RAST: 0
C HERBARUM IGE QN: <0.1 KU/L
C HERBARUM IGE RAST: 0
CALIF WALNUT POLN IGE QN: <0.1 KU/L
CALIF WALNUT POLN IGE RAST: 0
CAT DANDER IGE QN: <0.1 KU/L
CAT DANDER IGE RAST: 0
CMN PIGWEED IGE QN: <0.1 KU/L
CMN PIGWEED IGE RAST: 0
COMMON RAGWEED IGE QN: <0.1 KU/L
COMMON RAGWEED IGE RAST: 0
COTTONWOOD IGE QN: <0.1 KU/L
COTTONWOOD IGE RAST: 0
D FARINAE IGE QN: <0.1 KU/L
D FARINAE IGE RAST: 0
D PTERONYSS IGE QN: 0.11 KU/L
D PTERONYSS IGE RAST: ABNORMAL
DOG DANDER IGE QN: <0.1 KU/L
DOG DANDER IGE RAST: 0
IGE SERPL-ACNC: 56 KU/L
LONDON PLANE IGE QN: <0.1 KU/L
LONDON PLANE IGE RAST: 0
MOUSE URINE PROT IGE QN: <0.1 KU/L
MOUSE URINE PROT IGE RAST: 0
MT JUNIPER IGE QN: <0.1 KU/L
MT JUNIPER IGE RAST: 0
P NOTATUM IGE QN: <0.1 KU/L
P NOTATUM IGE RAST: 0
PECAN/HICK TREE IGE QN: <0.1 KU/L
PECAN/HICK TREE IGE RAST: 0
REF LAB TEST REF RANGE: NORMAL
ROACH IGE QN: <0.1 KU/L
ROACH IGE RAST: 0
SALTWORT IGE QN: <0.1 KU/L
SALTWORT IGE RAST: 0
SHEEP SORREL IGE QN: <0.1 KU/L
SHEEP SORREL IGE RAST: 0
SILVER BIRCH IGE QN: <0.1 KU/L
SILVER BIRCH IGE RAST: 0
TIMOTHY IGE QN: <0.1 KU/L
TIMOTHY IGE RAST: 0
WHITE ASH IGE QN: <0.1 KU/L
WHITE ASH IGE RAST: 0
WHITE ELM IGE QN: <0.1 KU/L
WHITE ELM IGE RAST: 0
WHITE MULBERRY IGE QN: <0.1 KU/L
WHITE MULBERRY IGE RAST: 0
WHITE OAK IGE QN: <0.1 KU/L
WHITE OAK IGE RAST: 0

## 2025-10-07 ENCOUNTER — APPOINTMENT (OUTPATIENT)
Dept: NEUROLOGY | Facility: CLINIC | Age: 65
End: 2025-10-07
Payer: COMMERCIAL

## 2025-10-15 ENCOUNTER — APPOINTMENT (OUTPATIENT)
Dept: OTOLARYNGOLOGY | Facility: CLINIC | Age: 65
End: 2025-10-15
Payer: COMMERCIAL

## 2026-03-03 ENCOUNTER — APPOINTMENT (OUTPATIENT)
Dept: PRIMARY CARE | Facility: CLINIC | Age: 66
End: 2026-03-03
Payer: COMMERCIAL